# Patient Record
Sex: MALE | Race: WHITE | URBAN - METROPOLITAN AREA
[De-identification: names, ages, dates, MRNs, and addresses within clinical notes are randomized per-mention and may not be internally consistent; named-entity substitution may affect disease eponyms.]

---

## 2017-10-03 ENCOUNTER — HISTORICAL (OUTPATIENT)
Dept: LAB | Facility: HOSPITAL | Age: 62
End: 2017-10-03

## 2017-10-06 ENCOUNTER — HISTORICAL (OUTPATIENT)
Dept: CARDIOLOGY | Facility: HOSPITAL | Age: 62
End: 2017-10-06

## 2018-05-10 ENCOUNTER — HISTORICAL (OUTPATIENT)
Dept: ADMINISTRATIVE | Facility: HOSPITAL | Age: 63
End: 2018-05-10

## 2018-05-10 LAB
ABS NEUT (OLG): 7
ALBUMIN SERPL-MCNC: 4.7 GM/DL (ref 3.4–5)
ALBUMIN/GLOB SERPL: 2.24 {RATIO} (ref 1.5–2.5)
ALP SERPL-CCNC: 47 UNIT/L (ref 38–126)
ALT SERPL-CCNC: 31 UNIT/L (ref 7–52)
APPEARANCE, UA: CLEAR
AST SERPL-CCNC: 22 UNIT/L (ref 15–37)
BACTERIA #/AREA URNS AUTO: ABNORMAL /HPF
BILIRUB SERPL-MCNC: 0.8 MG/DL (ref 0.2–1)
BILIRUB UR QL STRIP: ABNORMAL MG/DL
BILIRUBIN DIRECT+TOT PNL SERPL-MCNC: 0.2 MG/DL (ref 0–0.5)
BILIRUBIN DIRECT+TOT PNL SERPL-MCNC: 0.6 MG/DL
BUN SERPL-MCNC: 13 MG/DL (ref 7–18)
CALCIUM SERPL-MCNC: 9.2 MG/DL (ref 8.5–10)
CHLORIDE SERPL-SCNC: 103 MMOL/L (ref 98–107)
CHOLEST SERPL-MCNC: 122 MG/DL (ref 0–200)
CHOLEST/HDLC SERPL: 2.5 {RATIO}
CO2 SERPL-SCNC: 27 MMOL/L (ref 21–32)
COLOR UR: ABNORMAL
CREAT SERPL-MCNC: 1 MG/DL (ref 0.6–1.3)
ERYTHROCYTE [DISTWIDTH] IN BLOOD BY AUTOMATED COUNT: 12.5 % (ref 11.5–17)
EST. AVERAGE GLUCOSE BLD GHB EST-MCNC: 126 MG/DL
GLOBULIN SER-MCNC: 2.1 GM/DL (ref 1.2–3)
GLUCOSE (UA): NEGATIVE MG/DL
GLUCOSE SERPL-MCNC: 109 MG/DL (ref 74–106)
HBA1C MFR BLD: 6 % (ref 4.4–6.4)
HCT VFR BLD AUTO: 45.1 % (ref 42–52)
HDLC SERPL-MCNC: 49 MG/DL (ref 35–60)
HGB BLD-MCNC: 14.8 GM/DL (ref 14–18)
HGB UR QL STRIP: NEGATIVE UNIT/L
KETONES UR QL STRIP: ABNORMAL MG/DL
LDLC SERPL CALC-MCNC: 50 MG/DL (ref 0–129)
LEUKOCYTE ESTERASE UR QL STRIP: NEGATIVE UNIT/L
LYMPHOCYTES # BLD AUTO: 2 X10(3)/MCL (ref 0.6–3.4)
LYMPHOCYTES NFR BLD AUTO: 20.7 % (ref 13–40)
MCH RBC QN AUTO: 30.4 PG (ref 27–31.2)
MCHC RBC AUTO-ENTMCNC: 33 GM/DL (ref 32–36)
MCV RBC AUTO: 93 FL (ref 80–94)
MONOCYTES # BLD AUTO: 0.9 X10(3)/MCL (ref 0–1.8)
MONOCYTES NFR BLD AUTO: 8.6 % (ref 0.1–24)
NEUTROPHILS NFR BLD AUTO: 70.7 % (ref 47–80)
NITRITE UR QL STRIP.AUTO: NEGATIVE
PH UR STRIP: 5.5 [PH]
PLATELET # BLD AUTO: 271 X10(3)/MCL (ref 130–400)
PMV BLD AUTO: 10.4 FL
POTASSIUM SERPL-SCNC: 4.6 MMOL/L (ref 3.5–5.1)
PROT SERPL-MCNC: 6.8 GM/DL (ref 6.4–8.2)
PROT UR QL STRIP: NEGATIVE MG/DL
PSA SERPL-MCNC: 4.69 NG/ML (ref 0–4.5)
RBC # BLD AUTO: 4.87 X10(6)/MCL (ref 4.7–6.1)
RBC #/AREA URNS HPF: ABNORMAL /HPF
SODIUM SERPL-SCNC: 137 MMOL/L (ref 136–145)
SP GR UR STRIP: >1.03
SQUAMOUS EPITHELIAL, UA: ABNORMAL /LPF
TRIGL SERPL-MCNC: 77 MG/DL (ref 30–150)
TSH SERPL-ACNC: 1.5 MIU/ML (ref 0.35–4.94)
UROBILINOGEN UR STRIP-ACNC: 0.2 MG/DL
VLDLC SERPL CALC-MCNC: 15.4 MG/DL
WBC # SPEC AUTO: 9.9 X10(3)/MCL (ref 4.5–11.5)
WBC #/AREA URNS AUTO: ABNORMAL /[HPF]

## 2018-06-26 ENCOUNTER — HISTORICAL (OUTPATIENT)
Dept: ADMINISTRATIVE | Facility: HOSPITAL | Age: 63
End: 2018-06-26

## 2018-06-26 LAB — PSA SERPL-MCNC: 2.54 NG/ML (ref 0–4.5)

## 2018-12-17 ENCOUNTER — HISTORICAL (OUTPATIENT)
Dept: ADMINISTRATIVE | Facility: HOSPITAL | Age: 63
End: 2018-12-17

## 2018-12-17 LAB
EST. AVERAGE GLUCOSE BLD GHB EST-MCNC: 123 MG/DL
HBA1C MFR BLD: 5.9 % (ref 4.4–6.4)

## 2019-06-11 ENCOUNTER — HISTORICAL (OUTPATIENT)
Dept: ADMINISTRATIVE | Facility: HOSPITAL | Age: 64
End: 2019-06-11

## 2019-06-11 LAB
ABS NEUT (OLG): 6.1 X10(3)/MCL (ref 2.1–9.2)
ALBUMIN SERPL-MCNC: 4.7 GM/DL (ref 3.4–5)
ALBUMIN/GLOB SERPL: 2.14 {RATIO} (ref 1.5–2.5)
ALP SERPL-CCNC: 43 UNIT/L (ref 38–126)
ALT SERPL-CCNC: 39 UNIT/L (ref 7–52)
APPEARANCE, UA: CLEAR
AST SERPL-CCNC: 26 UNIT/L (ref 15–37)
BACTERIA #/AREA URNS AUTO: NORMAL /HPF
BILIRUB SERPL-MCNC: 0.9 MG/DL (ref 0.2–1)
BILIRUB UR QL STRIP: NEGATIVE MG/DL
BILIRUBIN DIRECT+TOT PNL SERPL-MCNC: 0.2 MG/DL (ref 0–0.5)
BILIRUBIN DIRECT+TOT PNL SERPL-MCNC: 0.7 MG/DL
BUN SERPL-MCNC: 18 MG/DL (ref 7–18)
CALCIUM SERPL-MCNC: 9.4 MG/DL (ref 8.5–10)
CHLORIDE SERPL-SCNC: 101 MMOL/L (ref 98–107)
CHOLEST SERPL-MCNC: 137 MG/DL (ref 0–200)
CHOLEST/HDLC SERPL: 2.5 {RATIO}
CO2 SERPL-SCNC: 26 MMOL/L (ref 21–32)
COLOR UR: YELLOW
CREAT SERPL-MCNC: 1.05 MG/DL (ref 0.6–1.3)
ERYTHROCYTE [DISTWIDTH] IN BLOOD BY AUTOMATED COUNT: 12.7 % (ref 11.5–17)
EST. AVERAGE GLUCOSE BLD GHB EST-MCNC: 126 MG/DL
GLOBULIN SER-MCNC: 2.2 GM/DL (ref 1.2–3)
GLUCOSE (UA): NEGATIVE MG/DL
GLUCOSE SERPL-MCNC: 110 MG/DL (ref 74–106)
HBA1C MFR BLD: 6 % (ref 4.4–6.4)
HCT VFR BLD AUTO: 44.8 % (ref 42–52)
HDLC SERPL-MCNC: 54 MG/DL (ref 35–60)
HGB BLD-MCNC: 15 GM/DL (ref 14–18)
HGB UR QL STRIP: NEGATIVE UNIT/L
KETONES UR QL STRIP: NEGATIVE MG/DL
LDLC SERPL CALC-MCNC: 57 MG/DL (ref 0–129)
LEUKOCYTE ESTERASE UR QL STRIP: NEGATIVE UNIT/L
LYMPHOCYTES # BLD AUTO: 2.1 X10(3)/MCL (ref 0.6–3.4)
LYMPHOCYTES NFR BLD AUTO: 23.5 % (ref 13–40)
MCH RBC QN AUTO: 30.4 PG (ref 27–31.2)
MCHC RBC AUTO-ENTMCNC: 34 GM/DL (ref 32–36)
MCV RBC AUTO: 91 FL (ref 80–94)
MONOCYTES # BLD AUTO: 0.9 X10(3)/MCL (ref 0.1–1.3)
MONOCYTES NFR BLD AUTO: 9.7 % (ref 0.1–24)
NEUTROPHILS NFR BLD AUTO: 66.8 % (ref 47–80)
NITRITE UR QL STRIP.AUTO: NEGATIVE
PH UR STRIP: 7 [PH]
PLATELET # BLD AUTO: 264 X10(3)/MCL (ref 130–400)
PMV BLD AUTO: 9.5 FL (ref 9.4–12.4)
POTASSIUM SERPL-SCNC: 4.5 MMOL/L (ref 3.5–5.1)
PROT SERPL-MCNC: 6.9 GM/DL (ref 6.4–8.2)
PROT UR QL STRIP: NORMAL MG/DL
PSA SERPL-MCNC: 1.62 NG/ML (ref 0–4.5)
RBC # BLD AUTO: 4.93 X10(6)/MCL (ref 4.7–6.1)
RBC #/AREA URNS HPF: NORMAL /HPF
SODIUM SERPL-SCNC: 136 MMOL/L (ref 136–145)
SP GR UR STRIP: 1.02
SQUAMOUS EPITHELIAL, UA: NORMAL /LPF
TRIGL SERPL-MCNC: 126 MG/DL (ref 30–150)
TSH SERPL-ACNC: 1.78 MIU/ML (ref 0.35–4.94)
UROBILINOGEN UR STRIP-ACNC: 0.2 MG/DL
VLDLC SERPL CALC-MCNC: 25.2 MG/DL
WBC # SPEC AUTO: 9.1 X10(3)/MCL (ref 4.5–11.5)
WBC #/AREA URNS AUTO: NORMAL /[HPF]

## 2019-09-18 ENCOUNTER — HISTORICAL (OUTPATIENT)
Dept: ADMINISTRATIVE | Facility: HOSPITAL | Age: 64
End: 2019-09-18

## 2019-12-11 ENCOUNTER — HISTORICAL (OUTPATIENT)
Dept: ADMINISTRATIVE | Facility: HOSPITAL | Age: 64
End: 2019-12-11

## 2019-12-11 LAB
EST. AVERAGE GLUCOSE BLD GHB EST-MCNC: 123 MG/DL
HBA1C MFR BLD: 5.9 % (ref 4.4–6.4)

## 2020-07-21 ENCOUNTER — HISTORICAL (OUTPATIENT)
Dept: ADMINISTRATIVE | Facility: HOSPITAL | Age: 65
End: 2020-07-21

## 2020-07-21 LAB
ABS NEUT (OLG): 5.7 X10(3)/MCL (ref 2.1–9.2)
ALBUMIN SERPL-MCNC: 4.6 GM/DL (ref 3.4–5)
ALBUMIN/GLOB SERPL: 2.19 {RATIO} (ref 1.5–2.5)
ALP SERPL-CCNC: 57 UNIT/L (ref 38–126)
ALT SERPL-CCNC: 52 UNIT/L (ref 7–52)
APPEARANCE, UA: CLEAR
AST SERPL-CCNC: 33 UNIT/L (ref 15–37)
BACTERIA #/AREA URNS AUTO: NORMAL /HPF
BILIRUB SERPL-MCNC: 0.7 MG/DL (ref 0.2–1)
BILIRUB UR QL STRIP: NEGATIVE MG/DL
BILIRUBIN DIRECT+TOT PNL SERPL-MCNC: 0.2 MG/DL (ref 0–0.5)
BILIRUBIN DIRECT+TOT PNL SERPL-MCNC: 0.5 MG/DL
BUN SERPL-MCNC: 13 MG/DL (ref 7–18)
CALCIUM SERPL-MCNC: 9.9 MG/DL (ref 8.5–10)
CHLORIDE SERPL-SCNC: 102 MMOL/L (ref 98–107)
CHOLEST SERPL-MCNC: 141 MG/DL (ref 0–200)
CHOLEST/HDLC SERPL: 2.9 {RATIO}
CO2 SERPL-SCNC: 29 MMOL/L (ref 21–32)
COLOR UR: YELLOW
CREAT SERPL-MCNC: 1.16 MG/DL (ref 0.6–1.3)
ERYTHROCYTE [DISTWIDTH] IN BLOOD BY AUTOMATED COUNT: 12.7 % (ref 11.5–17)
EST. AVERAGE GLUCOSE BLD GHB EST-MCNC: 120 MG/DL
GLOBULIN SER-MCNC: 2.1 GM/DL (ref 1.2–3)
GLUCOSE (UA): NEGATIVE MG/DL
GLUCOSE SERPL-MCNC: 111 MG/DL (ref 74–106)
HBA1C MFR BLD: 5.8 % (ref 4.4–6.4)
HCT VFR BLD AUTO: 45.2 % (ref 42–52)
HDLC SERPL-MCNC: 48 MG/DL (ref 35–60)
HGB BLD-MCNC: 14.9 GM/DL (ref 14–18)
HGB UR QL STRIP: NEGATIVE UNIT/L
KETONES UR QL STRIP: NEGATIVE MG/DL
LDLC SERPL CALC-MCNC: 65 MG/DL (ref 0–129)
LEUKOCYTE ESTERASE UR QL STRIP: NEGATIVE UNIT/L
LYMPHOCYTES # BLD AUTO: 2 X10(3)/MCL (ref 0.6–3.4)
LYMPHOCYTES NFR BLD AUTO: 23.4 % (ref 13–40)
MCH RBC QN AUTO: 30.2 PG (ref 27–31.2)
MCHC RBC AUTO-ENTMCNC: 33 GM/DL (ref 32–36)
MCV RBC AUTO: 92 FL (ref 80–94)
MONOCYTES # BLD AUTO: 0.7 X10(3)/MCL (ref 0.1–1.3)
MONOCYTES NFR BLD AUTO: 8.6 % (ref 0.1–24)
NEUTROPHILS NFR BLD AUTO: 68 % (ref 47–80)
NITRITE UR QL STRIP.AUTO: NEGATIVE
PH UR STRIP: 6.5 [PH]
PLATELET # BLD AUTO: 252 X10(3)/MCL (ref 130–400)
PMV BLD AUTO: 10.5 FL (ref 9.4–12.4)
POTASSIUM SERPL-SCNC: 4.6 MMOL/L (ref 3.5–5.1)
PROT SERPL-MCNC: 6.7 GM/DL (ref 6.4–8.2)
PROT UR QL STRIP: NEGATIVE MG/DL
PSA SERPL-MCNC: 2.25 NG/ML (ref 0–4.5)
RBC # BLD AUTO: 4.93 X10(6)/MCL (ref 4.7–6.1)
RBC #/AREA URNS HPF: NORMAL /HPF
SODIUM SERPL-SCNC: 138 MMOL/L (ref 136–145)
SP GR UR STRIP: 1.02
SQUAMOUS EPITHELIAL, UA: NORMAL /LPF
TRIGL SERPL-MCNC: 119 MG/DL (ref 30–150)
UROBILINOGEN UR STRIP-ACNC: 0.2 MG/DL
VLDLC SERPL CALC-MCNC: 23.8 MG/DL
WBC # SPEC AUTO: 8.4 X10(3)/MCL (ref 4.5–11.5)
WBC #/AREA URNS AUTO: NORMAL /[HPF]

## 2021-03-01 ENCOUNTER — HISTORICAL (OUTPATIENT)
Dept: ADMINISTRATIVE | Facility: HOSPITAL | Age: 66
End: 2021-03-01

## 2021-03-01 LAB
EST. AVERAGE GLUCOSE BLD GHB EST-MCNC: 126 MG/DL
HBA1C MFR BLD: 6 % (ref 4.4–6.4)

## 2021-08-06 LAB
ABS NEUT (OLG): 5.3 X10(3)/MCL (ref 2.1–9.2)
ALBUMIN SERPL-MCNC: 4.8 GM/DL (ref 3.4–5)
ALBUMIN/GLOB SERPL: 2.18 {RATIO} (ref 1.5–2.5)
ALP SERPL-CCNC: 50 UNIT/L (ref 38–126)
ALT SERPL-CCNC: 34 UNIT/L (ref 7–52)
APPEARANCE, UA: CLEAR
AST SERPL-CCNC: 27 UNIT/L (ref 15–37)
BACTERIA #/AREA URNS AUTO: NORMAL /HPF
BILIRUB SERPL-MCNC: 0.9 MG/DL (ref 0.2–1)
BILIRUB UR QL STRIP: NEGATIVE MG/DL
BILIRUBIN DIRECT+TOT PNL SERPL-MCNC: 0.2 MG/DL (ref 0–0.5)
BILIRUBIN DIRECT+TOT PNL SERPL-MCNC: 0.7 MG/DL
BUN SERPL-MCNC: 15 MG/DL (ref 7–18)
CALCIUM SERPL-MCNC: 10.1 MG/DL (ref 8.5–10.1)
CHLORIDE SERPL-SCNC: 101 MMOL/L (ref 98–107)
CHOLEST SERPL-MCNC: 116 MG/DL (ref 0–200)
CHOLEST/HDLC SERPL: 2.5 {RATIO}
CO2 SERPL-SCNC: 29 MMOL/L (ref 21–32)
COLOR UR: YELLOW
CREAT SERPL-MCNC: 0.96 MG/DL (ref 0.6–1.3)
ERYTHROCYTE [DISTWIDTH] IN BLOOD BY AUTOMATED COUNT: 12.4 % (ref 11.5–17)
EST CREAT CLEARANCE SER (OHS): 121.94 ML/MIN
GLOBULIN SER-MCNC: 2.2 GM/DL (ref 1.2–3)
GLUCOSE (UA): NEGATIVE MG/DL
GLUCOSE SERPL-MCNC: 108 MG/DL (ref 74–106)
HCT VFR BLD AUTO: 44.4 % (ref 42–52)
HDLC SERPL-MCNC: 46 MG/DL (ref 35–60)
HGB BLD-MCNC: 14.8 GM/DL (ref 14–18)
HGB UR QL STRIP: NEGATIVE UNIT/L
KETONES UR QL STRIP: NEGATIVE MG/DL
LDLC SERPL CALC-MCNC: 45 MG/DL (ref 0–129)
LEUKOCYTE ESTERASE UR QL STRIP: NEGATIVE UNIT/L
LYMPHOCYTES # BLD AUTO: 2 X10(3)/MCL (ref 0.6–3.4)
LYMPHOCYTES NFR BLD AUTO: 24.7 % (ref 13–40)
MCH RBC QN AUTO: 30.3 PG (ref 27–31.2)
MCHC RBC AUTO-ENTMCNC: 33 GM/DL (ref 32–36)
MCV RBC AUTO: 91 FL (ref 80–94)
MONOCYTES # BLD AUTO: 0.8 X10(3)/MCL (ref 0.1–1.3)
MONOCYTES NFR BLD AUTO: 9.8 % (ref 0.1–24)
NEUTROPHILS NFR BLD AUTO: 65.5 % (ref 47–80)
NITRITE UR QL STRIP.AUTO: NEGATIVE
PH UR STRIP: 6 [PH]
PLATELET # BLD AUTO: 256 X10(3)/MCL (ref 130–400)
PMV BLD AUTO: 10.4 FL (ref 9.4–12.4)
POTASSIUM SERPL-SCNC: 4.5 MMOL/L (ref 3.5–5.1)
PROT SERPL-MCNC: 7 GM/DL (ref 6.4–8.2)
PROT UR QL STRIP: NEGATIVE MG/DL
PSA SERPL-MCNC: 2.4 NG/ML (ref 0–4.5)
RBC # BLD AUTO: 4.89 X10(6)/MCL (ref 4.7–6.1)
RBC #/AREA URNS HPF: NORMAL /HPF
SODIUM SERPL-SCNC: 137 MMOL/L (ref 136–145)
SP GR UR STRIP: 1.01
SQUAMOUS EPITHELIAL, UA: NORMAL /LPF
TRIGL SERPL-MCNC: 103 MG/DL (ref 30–150)
UROBILINOGEN UR STRIP-ACNC: 0.2 MG/DL
VLDLC SERPL CALC-MCNC: 20.6 MG/DL
WBC # SPEC AUTO: 8.1 X10(3)/MCL (ref 4.5–11.5)
WBC #/AREA URNS AUTO: NORMAL /[HPF]

## 2021-08-09 ENCOUNTER — HISTORICAL (OUTPATIENT)
Dept: ADMINISTRATIVE | Facility: HOSPITAL | Age: 66
End: 2021-08-09

## 2021-08-09 LAB
EST. AVERAGE GLUCOSE BLD GHB EST-MCNC: 117 MG/DL
HBA1C MFR BLD: 5.7 % (ref 4.4–6.4)

## 2022-05-02 NOTE — HISTORICAL [facility]
This is a historical note converted from Daniel. Formatting and pictures may have been removed.  Please reference Daniel for original formatting and attached multimedia. Chief Complaint  6 MONTH RECHECK NPO  History of Present Illness  Pt has been feeling well.? He is doing well on C-PAP.? His blood pressures have been running 120s/80s. Pt had a rough semester; he is off this summer except for a few concerts.?? He has an apt next week with Dr Jennings.  Sleeping much better with his C-PAP.  Appetite seems to be declining as the weather gets warmer.  Does physical activity daily; plans to increase his exercise this summer.  No tobacco.  Has a beer 1-2 x week.  Colonoscopy 8-17; polyp x 1.  Review of Systems  Constitutional:?no?weight gain,?no?weight loss,?no?fatigue, ?no?fever, ?no?chills, ?no?weakness, ?no?trouble sleeping  Eyes: ?no?vision loss/changes,?+?glasses for nearsighted,??no?pain,??no?redness,??no?blurry or double vision,??no?flashing lights,??no?glaucoma,??no?cataracts  Last eye exam:? about 3 years  Neck: ?no?lymphadenopathy,??no?thyroid abnormalities,??no?bruits,??no?stiffness  Ears:?no?decreased hearing,??no?tinnitus,??no?earache,??no?drainage?  Nose:?no?congestion,??no?rhinorrhea,??no?epistaxis,??no?sinus pressure  Throat/Oral:?no?sore throat,??no?hoarseness, ?no?dental caries,??no?gum bleeding,??no?oral lesions  Cardiovascular:?no?chest pain, palpitations, or tightness,?no?dyspnea with exertion,??no?orthopnea,??no?paroxysmal nocturnal dyspnea  Respiratory:?no?cough,?no?sputum,??no?hemoptysis,??no?dyspnea,??no?wheezing,??no?pleuritic chest pain?  Gastrointestinal:?no?abd pain,?no?nausea,?no?vomiting,??no?heartburn,??no?dysphagia or odynophagia,??no?diarrhea,??no?constipation,??no?melena,??no?hematochezia,?no?jaundice  Urinary:?no?frequency,??no?urgency,??no?burning or pain,?no?hematuria,??no?incontinence,??no?hesitancy,??no?incomplete voiding,??no?flank  pain,??no?nocturia  Musculoskeletal:?no?myalgias,?no?arthralgias,?no?neck pain,??no?back pain,??no?swelling of extremities, occasional soreness right flank a few times a week  Skin:?no?rashes,?no?sores,?no?non-healing wounds  Neurologic:?no?headaches,?no?dizziness/lightheadedness,?no?tremors,?no?paresthesias,??no?seizures,??no?muscle weakness  Psychiatric:?no?depression/sadness,?no?anhedonia,?no?irritability,?no?suicidal ideations,?no?anxiety,?no?panic attacks  Endocrine:?no?hot or cold intolerance,?no?sweating,?no?polyuria,?no?polydipsia,?no?polyphagia  Hematologic:?no?bruising,??no?bleeding disorders?  Physical Exam  Vitals & Measurements  HR:?84(Peripheral)? BP:?120/86?  HT:?180?cm? HT:?180?cm? WT:?109.8?kg? WT:?109?kg? BMI:?33.64?  ?  GENERAL: The patient is a well-developed, well-nourished male in no?apparent distress. He is alert and oriented x 4.  HEENT: Head is normocephalic and atraumatic. Extraocular muscles are intact. Pupils are equal, round, and reactive to light and accommodation. Nares appeared normal. Mouth is well hydrated and without lesions. Mucous membranes are moist. Posterior pharynx clear of any exudate or lesions.  NECK: Supple. No carotid bruits.? No lymphadenopathy or thyromegaly.  LUNGS: Clear to auscultation.  HEART: Regular rate and rhythm without murmur.  ABDOMEN: Soft, nontender, and nondistended.? Positive bowel sounds.? No hepatosplenomegaly was noted.  EXTREMITIES: Without any cyanosis, clubbing, rash, lesions or edema.  NEUROLOGIC: Cranial nerves II through XII are grossly intact.? No motor or sensory deficits.? Cerebellar function intact.  SKIN: No ulceration or induration present.  :? Nl male genitalia, no hernias,??NST,??prostate soft, smooth and without nodules.  ?  Assessment/Plan  1.?Wellness examination  ?Pt is doing well and feeling better since on C-PAP.  Ordered:  CBC w/ Auto Diff, Routine collect, 05/10/18 8:11:00 CDT, Blood, Order for future visit, Stop date 05/10/18  8:11:00 CDT, Lab Collect, Wellness examination, 05/10/18 8:11:00 CDT  Clinic Follow up, *Est. 11/10/18 3:00:00 CST, Order for future visit, Wellness examination, [facility]  Comprehensive Metabolic Panel, Routine collect, 05/10/18 8:11:00 CDT, Blood, Order for future visit, Stop date 05/10/18 8:11:00 CDT, Lab Collect, Wellness examination  Hypertension, 05/10/18 8:11:00 CDT  Lab Collection Request, 05/10/18 8:11:00 CDT, [facility] - [facility], 05/10/18 8:11:00 CDT  Lipid Panel, Routine collect, 05/10/18 8:11:00 CDT, Blood, Order for future visit, Stop date 05/10/18 8:11:00 CDT, Lab Collect, High cholesterol  Wellness examination, 05/10/18 8:11:00 CDT  Preventative Health Care Est 40-64 years [id number] PC, Wellness examination, [facility] - [facility], 05/10/18 8:11:00 CDT  Prostate Specific Antigen, Routine collect, 05/10/18 8:11:00 CDT, Blood, Order for future visit, Stop date 05/10/18 8:11:00 CDT, Lab Collect, Wellness examination, 05/10/18 8:11:00 CDT  Urinalysis Complete no reflex, Routine collect, Urine, Order for future visit, 05/10/18 8:11:00 CDT, Stop date 05/10/18 8:11:00 CDT, Nurse collect, Wellness examination  ?  2.?Hypertension  Well controlled on meds.  Ordered:  Comprehensive Metabolic Panel, Routine collect, 05/10/18 8:11:00 CDT, Blood, Order for future visit, Stop date 05/10/18 8:11:00 CDT, Lab Collect, Wellness examination  Hypertension, 05/10/18 8:11:00 CDT  ?  3.?High cholesterol  ?Check today.  Ordered:  Lipid Panel, Routine collect, 05/10/18 8:11:00 CDT, Blood, Order for future visit, Stop date 05/10/18 8:11:00 CDT, Lab Collect, High cholesterol  Wellness examination, 05/10/18 8:11:00 CDT  ?  4.?IGT - Impaired glucose tolerance  ?Check today. Continue diet and weight loss.  Ordered:  Hemoglobin A1c, Routine collect, 05/10/18 8:11:00 CDT, Blood, Order for future visit, Stop date 05/10/18 8:11:00 CDT, Lab Collect, IGT - Impaired glucose tolerance, 05/10/18 8:11:00 CDT  ?  5.?WENDY - Obstructive sleep apnea  ?Doing  well on C-PAP.  ?  6.?Obesity  Pt advised lose weight and exercise.  Ordered:  Thyroid Stimulating Hormone, Routine collect, 05/10/18 8:11:00 CDT, Blood, Order for future visit, Stop date 05/10/18 8:11:00 CDT, Lab Collect, Obesity, 05/10/18 8:11:00 CDT  ?  Orders:  atorvastatin, 20 mg = 1 tab(s), Oral, qPM, # 30 tab(s), 11 Refill(s), Pharmacy: [facility]/pharmacy #[id number]  lisinopril, 10 mg = 1 tab(s), Oral, Daily, # 30 tab(s), 11 Refill(s), Pharmacy: [facility]/pharmacy #[id number]  Pt wishes to receive Shingrix vaccine when available.   Problem List/Past Medical History  Ongoing  High cholesterol  Hypertension  IGT - Impaired glucose tolerance  WENDY - Obstructive sleep apnea  Polyp of colon  Wellness examination  Historical  HLD - Hyperlipidemia  Obesity  Procedure/Surgical History  Catheter placement in coronary artery(s) for coronary angiography, including intraprocedural injection(s) for coronary angiography, imaging supervision and interpretation; with left heart catheterization including intraprocedural injection(s) for left nick (10/06/2017), Fluoroscopy of Left Heart using Low Osmolar Contrast (10/06/2017), Fluoroscopy of Multiple Coronary Arteries using Low Osmolar Contrast (10/06/2017), Measurement of Cardiac Sampling and Pressure, Left Heart, Percutaneous Approach (10/06/2017), Colonoscopy (08/02/2017), Colonoscopy (01/23/2013), Knee Surgery, Right (1999).  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 Home equipment: CPAP/BiPAP., 05/10/2018  Nutrition/Health  Regular, Caffeine intake amount: 2 CUPS OF COFFEE PER DAY., 05/10/2018  Substance Abuse  Never, 05/10/2018  Tobacco  Never smoker, 10/06/2017  Family History  CAD - Coronary artery disease: Mother.  Diabetes mellitus: Mother.  Food allergy: Sister.  Hypertension: Mother.  Obesity: Sister, Brother and Brother.  Immunizations  Vaccine Date Status   pneumococcal 13-valent conjugate vaccine 03/17/2015 Recorded

## 2022-05-02 NOTE — HISTORICAL [facility]
This is a historical note converted from Daniel. Formatting and pictures may have been removed.  Please reference Daniel for original formatting and attached multimedia. Chief Complaint  6 MONTH RECHECK- NPO  History of Present Illness  Pt presents for 6 month recheck. He does various exercise on a daily basis. He has cut back on his carbohydrates. He has been feeling well. He has a good diet. He has been doing well on his C-PAP.  His semester is over. He is preparing for January. He is urinating much better with the Flomax.  Review of Systems  See HPI.  He has an appointment?with Dr Jennings this morning.  Physical Exam  Vitals & Measurements  HR:?72(Peripheral)? BP:?108/76?  HT:?180?cm? HT:?180?cm? WT:?111.6?kg? WT:?111.6?kg? BMI:?34.44?  See Vitals.  Gen:? wd, wn wm in no apparent distress  CV:?reg s mrg  Chest: cta bilaterally  Assessment/Plan  1.?Hypertension?I10  ? Well controlled.  Ordered:  Clinic Follow Up Physical, *Est. 06/17/19 3:00:00 CDT, Order for future visit, Hypertension  High cholesterol  IGT - Impaired glucose tolerance  WENDY - Obstructive sleep apnea  Obesity, [facility]  Office/Outpatient Visit Level 3 Established [id number] PC, Hypertension  High cholesterol  IGT - Impaired glucose tolerance  WENDY - Obstructive sleep apnea  Obesity, [facility] - [facility], 12/17/18 9:54:00 CST  ?  2.?High cholesterol?E78.00  ? Well controlled.  Ordered:  Clinic Follow Up Physical, *Est. 06/17/19 3:00:00 CDT, Order for future visit, Hypertension  High cholesterol  IGT - Impaired glucose tolerance  WENDY - Obstructive sleep apnea  Obesity, [facility]  Office/Outpatient Visit Level 3 Established [id number] PC, Hypertension  High cholesterol  IGT - Impaired glucose tolerance  WENDY - Obstructive sleep apnea  Obesity, [facility] - [facility], 12/17/18 9:54:00 CST  ?  3.?IGT - Impaired glucose tolerance?R73.02  ? Check A1C today.  Ordered:  Clinic Follow Up Physical, *Est. 06/17/19 3:00:00 CDT, Order for future visit, Hypertension   High cholesterol  IGT - Impaired glucose tolerance  WENDY - Obstructive sleep apnea  Obesity, [facility]  Hemoglobin A1c, Routine collect, 12/17/18 9:54:00 CST, Blood, Order for future visit, Stop date 12/17/18 9:54:00 CST, Lab Collect, IGT - Impaired glucose tolerance, 12/17/18 9:54:00 CST  Office/Outpatient Visit Level 3 Established [id number] PC, Hypertension  High cholesterol  IGT - Impaired glucose tolerance  WENDY - Obstructive sleep apnea  Obesity, [facility] - [facility], 12/17/18 9:54:00 CST  ?  4.?WENDY - Obstructive sleep apnea?G47.33  ? Doing well on C-PAP.  Ordered:  Clinic Follow Up Physical, *Est. 06/17/19 3:00:00 CDT, Order for future visit, Hypertension  High cholesterol  IGT - Impaired glucose tolerance  WENDY - Obstructive sleep apnea  Obesity, [facility]  Office/Outpatient Visit Level 3 Established [id number] PC, Hypertension  High cholesterol  IGT - Impaired glucose tolerance  WENDY - Obstructive sleep apnea  Obesity, [facility] - [facility], 12/17/18 9:54:00 CST  ?  5.?Obesity?E66.9  ? Pt encourage to continue exercise and?diet.  Ordered:  Clinic Follow Up Physical, *Est. 06/17/19 3:00:00 CDT, Order for future visit, Hypertension  High cholesterol  IGT - Impaired glucose tolerance  WENDY - Obstructive sleep apnea  Obesity, [facility]  Office/Outpatient Visit Level 3 Established [id number] PC, Hypertension  High cholesterol  IGT - Impaired glucose tolerance  WENDY - Obstructive sleep apnea  Obesity, [facility] - [facility], 12/17/18 9:54:00 CST  ?   Problem List/Past Medical History  Ongoing  Elevated PSA  High cholesterol  Hypertension  IGT - Impaired glucose tolerance  Obesity  WENDY - Obstructive sleep apnea  Polyp of colon  Seborrheic keratosis  Historical  HLD - Hyperlipidemia  Procedure/Surgical History  Colonoscopy (08/02/2017)  Colonoscopy (01/23/2013)  Knee Surgery, Right (1999)   Medications  aspirin 81 mg oral tablet, 81 mg= 1 tab(s), Oral, qPM  atorvastatin 20 mg oral tablet, 20 mg= 1 tab(s), Oral, qPM, 11  refills  Fish Oil oral capsule, 1 cap(s), Oral, Daily  lisinopril 10 mg oral tablet, 10 mg= 1 tab(s), Oral, Daily, 11 refills  multivitamin with minerals (Adult Tab), 1 tab(s), Oral, Daily  tamsulosin 0.4 mg oral capsule, See Instructions, 5 refills  Allergies  penicillin?(Nightmares (as a toddler))  Social History  Alcohol  Current, Beer, Wine, 1-2 times per week, 05/10/2018  Employment/School  Employed, Work/School description: PROFESSOR., 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 Home equipment: CPAP/BiPAP., 05/10/2018  Nutrition/Health  Regular, Caffeine intake amount: 2 CUPS OF COFFEE PER DAY., 05/10/2018  Substance Abuse  Never, 05/10/2018  Tobacco  Never smoker, N/A, 12/17/2018  Never smoker, 10/06/2017  Family History  CAD - Coronary artery disease: Mother.  Diabetes mellitus: Mother.  Food allergy: Sister.  Hypertension: Mother.  Obesity: Sister, Brother and Brother.  Immunizations  Vaccine Date Status   influenza virus vaccine, inactivated 10/17/2018 Recorded   influenza virus vaccine, inactivated 09/19/2017 Recorded   pneumococcal 13-valent conjugate vaccine 03/17/2015 Recorded   influenza virus vaccine, inactivated 12/31/2014 Recorded   Health Maintenance  Health Maintenance  ???Pending?(in the next year)  ??? ??OverDue  ??? ? ? ?Coronary Artery Disease Maintenance-Antiplatelet Agent Prescribed due??and every?  ??? ? ? ?Coronary Artery Disease Maintenance-Lipid Lowering Therapy due??and every?  ??? ? ? ?Diabetes Screening due??and every?  ??? ? ? ?Influenza Vaccine due??and every?  ??? ? ? ?Aspirin Therapy for CVD Prevention due??10/03/18??and every 1??year(s)  ??? ??Due?  ??? ? ? ?ADL Screening due??12/17/18??and every 1??year(s)  ??? ? ? ?Alcohol Misuse Screening due??12/17/18??and every 1??year(s)  ??? ? ? ?Depression Screening due??12/17/18??and every?  ??? ? ? ?Hypertension Management-Education due??12/17/18??and every  1??year(s)  ??? ? ? ?Smoking Cessation due??12/17/18??Variable frequency  ??? ? ? ?Tetanus Vaccine due??12/17/18??and every 10??year(s)  ??? ? ? ?Zoster Vaccine due??12/17/18??and every 100??year(s)  ??? ??Due In Future?  ??? ? ? ?Hypertension Management-BMP not due until??05/10/19??and every 1??year(s)  ???Satisfied?(in the past 1 year)  ??? ??Satisfied?  ??? ? ? ?Blood Pressure Screening on??12/17/18.??Satisfied by Louise Ulloa LPN  ??? ? ? ?Body Mass Index Check on??12/17/18.??Satisfied by Louise Ulloa LPN  ??? ? ? ?Coronary Artery Disease Maintenance-Lipid Lowering Therapy on??05/10/18.??Satisfied by Claudio Maria MD  ??? ? ? ?Diabetes Screening on??05/10/18.??Satisfied by Nereyda Coker  ??? ? ? ?Hypertension Management-Blood Pressure on??12/17/18.??Satisfied by Louise Ulloa LPN  ??? ? ? ?Influenza Vaccine on??10/17/18.??Satisfied by Bubba Vasquez LPN  ??? ? ? ?Lipid Screening on??05/10/18.??Satisfied by Justin Christian  ??? ? ? ?Obesity Screening on??12/17/18.??Satisfied by Louise Ulloa LPN  ?  ?

## 2022-05-02 NOTE — HISTORICAL [facility]
This is a historical note converted from Daniel. Formatting and pictures may have been removed.  Please reference Daniel for original formatting and attached multimedia. Chief Complaint  6 WEEK RECHECK- PROSTATITIS  History of Present Illness  Pt is? here to recheck his PSA.? He immediately noticed some improvement with urination when he started the antibiotic.? He has been hydrating more. He urinates every 2 hours. He urinates 2 x nite. He denies dysuria. He reports decreased stream. He doesnt empty his bladder. He has intermittency. He denies hesitancy or dribbling. No fever/chills. He denies any history of utis?or prostatitis. No testicular pain.? He denies any family history of prostate issues.  Review of Systems  See HPI.  Physical Exam  Vitals & Measurements  HR:?72(Peripheral)? BP:?130/80?  HT:?180?cm? HT:?180?cm? WT:?111.4?kg? WT:?111.4?kg? BMI:?34.38?  : deferred  Assessment/Plan  1.?Elevated PSA  ?Recheck today. Pt has symptoms consistent with prostatism. Will give trial of Flomax 0.4 mg and have patient call in 1 month with update on how he is urinating.  Ordered:  Lab Collection Request, 06/26/18 8:50:00 CDT, [facility] - [facility], 06/26/18 8:50:00 CDT  Office/Outpatient Visit Level 2 Established [id number] PC, Elevated PSA, [facility] - [facility], 06/26/18 8:50:00 CDT  Prostate Specific Antigen, Routine collect, 06/26/18 8:50:00 CDT, Blood, Order for future visit, Stop date 06/26/18 8:50:00 CDT, Lab Collect, Elevated PSA, 06/26/18 8:50:00 CDT  ?  Orders:  Clinic Follow-up PRN, 06/26/18 8:50:00 CDT, [facility] - [facility], Future Order   Problem List/Past Medical History  Ongoing  Elevated PSA  High cholesterol  Hypertension  IGT - Impaired glucose tolerance  WENDY - Obstructive sleep apnea  Polyp of colon  Wellness examination  Historical  HLD - Hyperlipidemia  Obesity  Procedure/Surgical History  Catheter placement in coronary artery(s) for coronary angiography, including intraprocedural injection(s) for coronary angiography,  imaging supervision and interpretation; with left heart catheterization including intraprocedural injection(s) for left nick (10/06/2017), Fluoroscopy of Left Heart using Low Osmolar Contrast (10/06/2017), Fluoroscopy of Multiple Coronary Arteries using Low Osmolar Contrast (10/06/2017), Measurement of Cardiac Sampling and Pressure, Left Heart, Percutaneous Approach (10/06/2017), Colonoscopy (08/02/2017), Colonoscopy (01/23/2013), Knee Surgery, Right (1999).  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 Home equipment: CPAP/BiPAP., 05/10/2018  Nutrition/Health  Regular, Caffeine intake amount: 2 CUPS OF COFFEE PER DAY., 05/10/2018  Substance Abuse  Never, 05/10/2018  Tobacco  Never smoker, 10/06/2017  Family History  CAD - Coronary artery disease: Mother.  Diabetes mellitus: Mother.  Food allergy: Sister.  Hypertension: Mother.  Obesity: Sister, Brother and Brother.  Immunizations  Vaccine Date Status   pneumococcal 13-valent conjugate vaccine 03/17/2015 Recorded   Health Maintenance  Health Maintenance  ???Pending?(in the next year)  ??? ??Due?  ??? ? ? ?Alcohol Misuse Screening due??06/26/18??and every 1??year(s)  ??? ? ? ?Depression Screening due??06/26/18??and every 1??year(s)  ??? ? ? ?Hypertension Management-Education due??06/26/18??and every 1??year(s)  ??? ? ?  ?Smoking Cessation due??06/26/18??and every 1??year(s)  ??? ? ? ?Tetanus Vaccine due??06/26/18??and every 10??year(s)  ??? ? ? ?Zoster Vaccine due??06/26/18??and every 100??year(s)  ??? ??Due In Future?  ??? ? ? ?Influenza Vaccine not due until??10/02/18??and every 1??year(s)  ??? ? ? ?Aspirin Therapy for CVD Prevention not due until??10/03/18??and every 1??year(s)  ??? ? ? ?Hypertension Management-Blood Pressure not due until??12/26/18??and every 6??month(s)  ??? ? ? ?Hypertension Management-BMP not due until??05/10/19??and every 1??year(s)  ??? ? ? ?Lipid Screening not due until??05/10/19??and every 1??year(s)  ???Satisfied?(in the past 1 year)  ??? ??Satisfied?  ??? ? ? ?Aspirin Therapy for CVD Prevention on??10/03/17.  ??? ? ? ?Blood Pressure Screening on??06/26/18.??Satisfied by Louise Ulloa  ??? ? ? ?Body Mass Index Check on??06/26/18.??Satisfied by Louise Ulloa  ??? ? ? ?Colorectal Screening on??08/02/17.??Satisfied by Louise Ulloa  ??? ? ? ?Coronary Artery Disease Maintenance-Lipid Lowering Therapy on??05/10/18.??Satisfied by Claudio Maria MD  ??? ? ? ?Diabetes Screening on??05/10/18.??Satisfied by Nereyda Coker  ??? ? ? ?Hypertension Management-Blood Pressure on??06/26/18.??Satisfied by Louise Ulloa  ??? ? ? ?Lipid Screening on??05/10/18.??Satisfied by Justin Christian  ??? ? ? ?Obesity Screening on??06/26/18.??Satisfied by Louise Ulloa  ??? ? ? ?Tobacco Use Screening on??06/26/18.??Satisfied by Louise Ulloa  ?  ?

## 2022-05-02 NOTE — HISTORICAL [facility]
This is a historical note converted from Daniel. Formatting and pictures may have been removed.  Please reference Daniel for original formatting and attached multimedia. Chief Complaint  ANNUAL WELLNESS PHYSICAL- NPO  History of Present Illness  Patient presents for wellness exam.  He has been feeling well; he is feeling better.  He is sleeping better with his C-PAP; he is compliant with treatment.  He has cleaned up his diet and is eating less.  He is a professor.  He has?beer or wine once a week.  He exercises daily: bikes, walks, weights.  He drinks 2 cups of coffee a day.?  He does not smoke.  Colonoscopy 2017: Dr Thomas.  Cardiologist: Dr Jennings; he has an appointment next week  Review of Systems  Constitutional:?no?weight gain,?no?weight loss,?+?fatigue: slowly improving over time, ?no?fever, ?no?chills, ?no?weakness, ?no?trouble sleeping  Eyes: ?no?vision loss/changes,?+?glasses,??no?pain,??no?redness,??no?blurry or double vision,??no?flashing lights,??no?glaucoma,??no?cataracts  Last eye exam:?about 1 year ago  Neck: ?no?lymphadenopathy,??no?thyroid abnormalities,??no?bruits,??no?stiffness  Ears:?no?decreased hearing,??no?tinnitus,??no?earache,??no?drainage?  Nose:?no?congestion,??no?rhinorrhea,??no?epistaxis,??no?sinus pressure  Throat/Oral:?no?sore throat,??no?hoarseness, ?no?dental caries,??no?gum bleeding,??no?oral lesions  Cardiovascular:?no?chest pain, palpitations, or tightness,?no?dyspnea with exertion,??no?orthopnea,??no?paroxysmal nocturnal dyspnea  Respiratory:?no?cough,?no?sputum,??no?hemoptysis,??no?dyspnea,??no?wheezing,??no?pleuritic chest pain?  Gastrointestinal:?no?abdominal pain,?no?nausea,?no?vomiting,??no?heartburn,??no?dysphagia or odynophagia,??no?diarrhea,??no?constipation,??no?melena,??no?hematochezia,?no?jaundice  Urinary:?no?frequency,??no?urgency,??no?burning or pain,?no?hematuria,??no?incontinence,??no?hesitancy,??no?incomplete voiding,??no?flank  pain,??no?nocturia  Musculoskeletal:?no?myalgias,?no?arthralgias,?no?neck pain,??+?back pain: right lower, hurts regularly,??no?swelling of extremities  Skin:?no?rashes,?no?sores,?no?non-healing wounds  Neurologic:?no?headaches,?no?dizziness/lightheadedness,?no?tremors,?no?paresthesias,??no?seizures,??no?muscle weakness  Psychiatric:?no?depression/sadness,?no?anhedonia,?no?irritability,?no?suicidal ideations,?no?anxiety,?no?panic attacks  Endocrine:?no?hot or cold intolerance,?no?sweating,?no?polyuria,?no?polydipsia,?no?polyphagia  Hematologic:?no?bruising,??no?bleeding disorders?  Physical Exam  Vitals & Measurements  HR:?72(Peripheral)? BP:?90/60?  HT:?180?cm? WT:?110.9?kg? BMI:?34.23?  ?  GENERAL: The patient is a well-developed, well-nourished male in no?apparent distress. He is alert and oriented x 4.  HEENT: Head is normocephalic and atraumatic. Extraocular muscles are intact. Pupils are equal, round, and reactive to light and accommodation. Nares appeared normal. Mouth is well hydrated and without lesions. Mucous membranes are moist. Posterior pharynx clear of any exudate or lesions.  NECK: Supple. No carotid bruits.? No lymphadenopathy or thyromegaly. ?He has a fungal rash?around his neck.  LUNGS: Clear to auscultation.  HEART: Regular rate and rhythm without murmurs, rubs or gallops.  ABDOMEN: Soft, nontender, and nondistended.? Positive bowel sounds.? No hepatosplenomegaly was noted.  EXTREMITIES: Without any cyanosis, clubbing, rash, lesions or edema. ?Left plantar foot:?Small plantar wart?in area of fifth?metatarsal head.  NEUROLOGIC: Cranial nerves II through XII are grossly intact.? No motor or sensory deficits.? Cerebellar function intact.  SKIN: No ulceration or induration present. ?Seborrheic keratosis?over right lateral?posterior rib cage.  :? Normal male genitalia, no hernias,?testes descended with normal size and consistency.??NST,??prostate soft, smooth and without nodules, mildly  enlarged.  ?  Assessment/Plan  1.?Wellness examination?Z00.00  Patient is doing well.? He is physically active?and eating well.  Tdap?0.5 IM administered.  Ordered:  Preventative Health Care Est 40-64 years [id number] PC, Wellness examination  Hypertension  High cholesterol  IGT - Impaired glucose tolerance  WENDY - Obstructive sleep apnea, [facility], 06/11/19 8:35:00 CDT  Prostate Specific Antigen, Routine collect, 06/11/19 8:55:00 CDT, Blood, Stop date 06/11/19 8:55:00 CDT, Lab Collect, Wellness examination, 06/11/19 8:55:00 CDT  ?  2.?Hypertension?I10  ?Well-controlled.  Ordered:  Clinic Follow up, *Est. 12/11/19 8:30:00 CST, Order for future visit, WENDY - Obstructive sleep apnea, [facility]  Preventative Health Care Est 40-64 years [id number] PC, Wellness examination  Hypertension  High cholesterol  IGT - Impaired glucose tolerance  WENDY - Obstructive sleep apnea, [facility] - [facility], 06/11/19 8:35:00 CDT  Thyroid Stimulating Hormone, Routine collect, 06/11/19 8:55:00 CDT, Blood, Stop date 06/11/19 8:55:00 CDT, Lab Collect, Hypertension, 06/11/19 8:55:00 CDT  ?  3.?High cholesterol?E78.00  ?We will check today.  Ordered:  Clinic Follow up, *Est. 12/11/19 8:30:00 CST, Order for future visit, WENDY - Obstructive sleep apnea, [facility]  Preventative Health Care Est 40-64 years [id number] PC, Wellness examination  Hypertension  High cholesterol  IGT - Impaired glucose tolerance  WENDY - Obstructive sleep apnea, [facility], 06/11/19 8:35:00 CDT  ?  4.?IGT - Impaired glucose tolerance?R73.02  ?We will check A1c today.  Ordered:  Clinic Follow up, *Est. 12/11/19 8:30:00 CST, Order for future visit, WENDY - Obstructive sleep apnea, [facility]  Preventative Health Care Est 40-64 years [id number] PC, Wellness examination  Hypertension  High cholesterol  IGT - Impaired glucose tolerance  WENDY - Obstructive sleep apnea, [facility], 06/11/19 8:35:00 CDT  ?  5.?WENDY - Obstructive sleep apnea?G47.33  ?He is compliant with and doing  well on CPAP.  Ordered:  Clinic Follow up, *Est. 12/11/19 8:30:00 CST, Order for future visit, WENDY - Obstructive sleep apnea, [facility]  Preventative Health Care Est 40-64 years [id number] PC, Wellness examination  Hypertension  High cholesterol  IGT - Impaired glucose tolerance  WENDY - Obstructive sleep apnea, [facility], 06/11/19 8:35:00 CDT  ?  6.?Plantar wart?B07.0  ?Lesion treated with cryotherapy.? Post-cryo?skin care discussed with patient.  Ordered:  Lesion 1 Benign/Prelim - Destruction [id number] PC, 06/11/19 9:01:00 CDT, [facility], 06/11/19 9:01:00 CDT  ?  7.?Tinea versicolor?B36.0  ?Ketoconazole?200 mg: Take 2 tablets as directed. ?Patient instructed on how to take for?maximum effectiveness.  ?  Orders:  ketoconazole, See Instructions, Take 2 tablets as one dose as directed., # 2 tab(s), 0 Refill(s), Pharmacy: [facility] #[id number]  tamsulosin, See Instructions, TAKE ONE CAPSULE BY MOUTH DAILY., # 30 cap(s), 5 Refill(s), Pharmacy: [facility]/pharmacy #[id number]  Referrals  Clinic Follow up, *Est. 12/11/19 8:30:00 CST, Order for future visit, WENDY - Obstructive sleep apnea, [facility]   Problem List/Past Medical History  Ongoing  Elevated PSA  High cholesterol  Hypertension  IGT - Impaired glucose tolerance  Obesity  WENDY - Obstructive sleep apnea  Plantar wart  Tinea versicolor  Historical  HLD - Hyperlipidemia  Polyp of colon  Seborrheic keratosis  Procedure/Surgical History  Catheter placement in coronary artery(s) for coronary angiography, including intraprocedural injection(s) for coronary angiography, imaging supervision and interpretation; with left heart catheterization including intraprocedural injection(s) for left nick (10/06/2017)  Fluoroscopy of Left Heart using Low Osmolar Contrast (10/06/2017)  Fluoroscopy of Multiple Coronary Arteries using Low Osmolar Contrast (10/06/2017)  Measurement of Cardiac Sampling and Pressure, Left Heart, Percutaneous Approach (10/06/2017)  Colonoscopy (08/02/2017)  Colonoscopy  (01/23/2013)  Knee Surgery, Right (1999)   Medications  aspirin 81 mg oral tablet, 81 mg= 1 tab(s), Oral, qPM  atorvastatin 20 mg oral tablet, 20 mg= 1 tab(s), Oral, qPM, 11 refills  Fish Oil oral capsule, 1 cap(s), Oral, Daily  ketoconazole 200 mg oral tablet, See Instructions  lisinopril 10 mg oral tablet, See Instructions, 2 refills  multivitamin with minerals (Adult Tab), 1 tab(s), Oral, Daily  tamsulosin 0.4 mg oral capsule, See Instructions, 5 refills  Allergies  penicillin?(Nightmares (as a toddler))  Social History  Abuse/Neglect  No, 06/11/2019  Alcohol  Current, Beer, Wine, 1-2 times per week, 05/10/2018  Employment/School  Employed, Work/School description: PROFESSOR., 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 Home equipment: CPAP/BiPAP., 05/10/2018  Nutrition/Health  Regular, Caffeine intake amount: 2 CUPS OF COFFEE PER DAY., 05/10/2018  Substance Use  Never, 05/10/2018  Tobacco  Never (less than 100 in lifetime), No, 06/11/2019  Never smoker, N/A, 12/17/2018  Never smoker, 10/06/2017  Family History  CAD - Coronary artery disease: Mother.  Diabetes mellitus: Mother.  Food allergy: Sister.  Hypertension: Mother.  Obesity: Sister, Brother and Brother.  Immunizations  Vaccine Date Status   tetanus/diphtheria/pertussis, acel(Tdap) 06/11/2019 Given   influenza virus vaccine, inactivated 10/17/2018 Recorded   influenza virus vaccine, inactivated 09/19/2017 Recorded   pneumococcal 13-valent conjugate vaccine 03/17/2015 Recorded   influenza virus vaccine, inactivated 12/31/2014 Recorded   Health Maintenance  Health Maintenance  ???Pending?(in the next year)  ??? ??OverDue  ??? ? ? ?Coronary Artery Disease Maintenance-Antiplatelet Agent Prescribed due??and every?  ??? ? ? ?Coronary Artery Disease Maintenance-Lipid Lowering Therapy due??and every?  ??? ? ? ?Diabetes Screening due??and every?  ??? ? ? ?Aspirin Therapy for CVD Prevention  due??10/03/18??and every 1??year(s)  ??? ? ? ?Alcohol Misuse Screening due??01/01/19??and every 1??year(s)  ??? ??Due?  ??? ? ? ?ADL Screening due??06/11/19??and every 1??year(s)  ??? ? ? ?Depression Screening due??06/11/19??and every?  ??? ? ? ?Hypertension Management-Education due??06/11/19??and every 1??year(s)  ??? ? ? ?Zoster Vaccine due??06/11/19??and every 100??year(s)  ??? ??Due In Future?  ??? ? ? ?Obesity Screening not due until??01/01/20??and every 1??year(s)  ??? ? ? ?Blood Pressure Screening not due until??06/10/20??and every 1??year(s)  ??? ? ? ?Body Mass Index Check not due until??06/10/20??and every 1??year(s)  ??? ? ? ?Hypertension Management-Blood Pressure not due until??06/10/20??and every 1??year(s)  ??? ? ? ?Hypertension Management-BMP not due until??06/10/20??and every 1??year(s)  ???Satisfied?(in the past 1 year)  ??? ??Satisfied?  ??? ? ? ?Blood Pressure Screening on??06/11/19.??Satisfied by Louise Ulloa LPN  ??? ? ? ?Body Mass Index Check on??06/11/19.??Satisfied by Louise Ulloa LPN  ??? ? ? ?Diabetes Screening on??06/11/19.??Satisfied by Ariadne Everett  ??? ? ? ?Hypertension Management-BMP on??06/11/19.??Satisfied by Ariadne Everett  ??? ? ? ?Influenza Vaccine on??10/17/18.??Satisfied by Bubba Vasquez LPN  ??? ? ? ?Lipid Screening on??06/11/19.??Satisfied by Ariadne Everett  ??? ? ? ?Obesity Screening on??06/11/19.??Satisfied by Louise Ulloa LPN  ??? ? ? ?Tetanus Vaccine on??06/11/19.??Satisfied by Louise Ulloa LPN  ?  ?

## 2022-05-03 NOTE — HISTORICAL [facility]
This is a historical note converted from Daniel. Formatting and pictures may have been removed.  Please reference Daniel for original formatting and attached multimedia. Chief Complaint  LEFT FOOT PAIN  History of Present Illness  He has had left foot pain x 1 month. One day, he walked all day in his loafers. His foot has been bothering him since. He has not been taking anything. Denies any injuries or trauma to foot. He normally wears a good dress shoe. He does recreational walking. He walks a lot. His pain in the heel area.  Review of Systems  See HPI.  Physical Exam  Vitals & Measurements  HR:?84(Peripheral)? BP:?118/80?  HT:?180?cm? WT:?113.4?kg? BMI:?35?  General: He is well-developed well-nourished pleasant white male in no apparent distress.  Left foot: Normal in appearance,?no skin lesions,?mildly tender to palpation over medial heel.  Assessment/Plan  1.?Plantar fasciitis, left?M72.2  Patient info?given.  Ice bottle for massage/Achilles stretches.  Aleve/ibuprofen.  Patient advised to wear proper footwear;?pt advised to go to [facility].  Ordered:  Office/Outpatient Visit Level 3 Established [id number] PC, Plantar fasciitis, left, [facility], 09/18/19 16:52:00 CDT  ?  Orders:  Clinic Follow-up PRN, 09/18/19 16:52:00 CDT, [facility], Future Order  XR Foot Left Minimum 3 Views, Routine, 09/18/19 16:02:00 CDT, Other (please specify), None, Patient Bed, Patient Has IV?, Rad Type, Left foot pain, [facility], 09/18/19 16:02:00 CDT  Referrals  Clinic Follow-up PRN, 09/18/19 16:52:00 CDT, [facility] - [facility], Future Order   Problem List/Past Medical History  Ongoing  Elevated PSA  High cholesterol  Hypertension  IGT - Impaired glucose tolerance  Obesity  WENDY - Obstructive sleep apnea  Plantar fasciitis, left  Plantar wart  Tinea versicolor  Historical  HLD - Hyperlipidemia  Polyp of colon  Seborrheic keratosis  Procedure/Surgical History  Catheter placement in coronary artery(s) for coronary  angiography, including intraprocedural injection(s) for coronary angiography, imaging supervision and interpretation; with left heart catheterization including intraprocedural injection(s) for left nick (10/06/2017)  Fluoroscopy of Left Heart using Low Osmolar Contrast (10/06/2017)  Fluoroscopy of Multiple Coronary Arteries using Low Osmolar Contrast (10/06/2017)  Measurement of Cardiac Sampling and Pressure, Left Heart, Percutaneous Approach (10/06/2017)  Colonoscopy (08/02/2017)  Colonoscopy (01/23/2013)  Knee Surgery, Right (1999)   Medications  aspirin 81 mg oral tablet, 81 mg= 1 tab(s), Oral, qPM  atorvastatin 20 mg oral tablet, See Instructions, 11 refills  atorvastatin 20 mg oral tablet, 20 mg= 1 tab(s), Oral, qPM, 11 refills  atorvastatin 20 mg oral tablet, See Instructions, 5 refills  Fish Oil oral capsule, 1 cap(s), Oral, Daily  lisinopril 10 mg oral tablet, See Instructions, 2 refills  lisinopril 10 mg oral tablet, See Instructions, 5 refills  multivitamin with minerals (Adult Tab), 1 tab(s), Oral, Daily  tamsulosin 0.4 mg oral capsule, See Instructions, 5 refills  Allergies  penicillin?(Nightmares (as a toddler))  Social History  Abuse/Neglect  No, 09/18/2019  No, 06/11/2019  Alcohol  Current, Beer, Wine, 1-2 times per week, 05/10/2018  Employment/School  Employed, Work/School description: PROFESSOR., 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 Home equipment: CPAP/BiPAP., 05/10/2018  Nutrition/Health  Regular, Caffeine intake amount: 2 CUPS OF COFFEE PER DAY., 05/10/2018  Substance Use  Never, 05/10/2018  Tobacco  Never (less than 100 in lifetime), N/A, 09/18/2019  Never (less than 100 in lifetime), No, 06/11/2019  Never smoker, N/A, 12/17/2018  Never smoker, 10/06/2017  Family History  CAD - Coronary artery disease: Mother.  Diabetes mellitus: Mother.  Food allergy: Sister.  Hypertension: Mother.  Obesity: Sister, Brother and  Brother.  Immunizations  Vaccine Date Status   tetanus/diphtheria/pertussis, acel(Tdap) 06/11/2019 Given   influenza virus vaccine, inactivated 10/17/2018 Recorded   influenza virus vaccine, inactivated 09/19/2017 Recorded   pneumococcal 13-valent conjugate vaccine 03/17/2015 Recorded   influenza virus vaccine, inactivated 12/31/2014 Recorded   Health Maintenance  Health Maintenance  ???Pending?(in the next year)  ??? ??OverDue  ??? ? ? ?Coronary Artery Disease Maintenance-Antiplatelet Agent Prescribed due??and every?  ??? ? ? ?Coronary Artery Disease Maintenance-Lipid Lowering Therapy due??and every?  ??? ? ? ?Diabetes Screening due??and every?  ??? ? ? ?Aspirin Therapy for CVD Prevention due??10/03/18??and every 1??year(s)  ??? ? ? ?Alcohol Misuse Screening due??01/01/19??and every 1??year(s)  ??? ??Due?  ??? ? ? ?ADL Screening due??09/18/19??and every 1??year(s)  ??? ? ? ?Depression Screening due??09/18/19??and every?  ??? ? ? ?Hypertension Management-Education due??09/18/19??and every 1??year(s)  ??? ? ? ?Influenza Vaccine due??09/18/19??and every?  ??? ? ? ?Zoster Vaccine due??09/18/19??and every 100??year(s)  ??? ??Due In Future?  ??? ? ? ?Obesity Screening not due until??01/01/20??and every 1??year(s)  ??? ? ? ?Hypertension Management-BMP not due until??06/10/20??and every 1??year(s)  ??? ? ? ?Blood Pressure Screening not due until??09/17/20??and every 1??year(s)  ??? ? ? ?Body Mass Index Check not due until??09/17/20??and every 1??year(s)  ??? ? ? ?Hypertension Management-Blood Pressure not due until??09/17/20??and every 1??year(s)  ???Satisfied?(in the past 1 year)  ??? ??Satisfied?  ??? ? ? ?Blood Pressure Screening on??09/18/19.??Satisfied by Louise Ulloa LPN  ??? ? ? ?Body Mass Index Check on??09/18/19.??Satisfied by Louise Ulloa LPN  ??? ? ? ?Coronary Artery Disease Maintenance-Lipid Lowering Therapy on??07/22/19.??Satisfied by Claudio Maria MD  ??? ? ? ?Diabetes Screening on??06/11/19.??Satisfied  by Ariadne Everett  ??? ? ? ?Hypertension Management-Blood Pressure on??09/18/19.??Satisfied by Louise Ulloa LPN  ??? ? ? ?Influenza Vaccine on??09/18/19.??Satisfied by Louise Ulloa LPN  ??? ? ? ?Lipid Screening on??06/11/19.??Satisfied by Ariadne Everett  ??? ? ? ?Obesity Screening on??09/18/19.??Satisfied by Louise Ulloa LPN  ??? ? ? ?Tetanus Vaccine on??06/11/19.??Satisfied by Louise Ulloa LPN  ?      X-rays of left foot are normal.

## 2022-05-03 NOTE — HISTORICAL [facility] CERNER
This is a historical note converted from Daniel. Formatting and pictures may have been removed.  Please reference Daniel for original formatting and attached multimedia. Chief Complaint  6 MONTH RECHECK; PLANTAR FASCIITIS  History of Present Illness  Patient presents for a?6-month recheck. He has been doing well except for his left plantar fasciitis. He states his foot is doing better. He uses an ice massage and he found a boot that stretches his foot. He still has daily discomfort; it limits his activities.  es not eat starches. He does not eat carbs after 4. He has changed to brown rice. He eats sweet potatoes.  He sleeps well with his C-Pap.  He had a rough semester and had some colleagues pass away.  He still has some tinea versicolor around his anterior neck.  Review of Systems  See HPI.  Physical Exam  Vitals & Measurements  HR:?72(Peripheral)? BP:?110/72?  HT:?180?cm? WT:?114.7?kg? BMI:?35.4?  General: He is well-developed well-nourished pleasant obese white male in no apparent distress.  Chest: Clear to auscultation bilaterally.? CV: Regular rate and rhythm without murmurs rubs or gallops.  Assessment/Plan  1.?Hypertension?I10  ?Well-controlled; continue current medication.  Ordered:  Clinic Follow up, *Est. 06/11/20 3:00:00 CDT, Order for future visit, Hypertension  High cholesterol  IGT - Impaired glucose tolerance, ink AFP  Office/Outpatient Visit Level 3 Established [id number] PC, Hypertension  High cholesterol  IGT - Impaired glucose tolerance, INK AMB - AFP, 12/11/19 8:22:00 CST  ?  2.?High cholesterol?E78.00  Well-controlled as per last lipid panel.  Ordered:  Clinic Follow up, *Est. 06/11/20 3:00:00 CDT, Order for future visit, Hypertension  High cholesterol  IGT - Impaired glucose tolerance, HLink AFP  Office/Outpatient Visit Level 3 Established [id number] PC, Hypertension  High cholesterol  IGT - Impaired glucose tolerance, HLINK AMB - AFP, 12/11/19 8:22:00 CST  ?  3.?IGT - Impaired glucose  tolerance?R73.02  ?Check A1c today. ?Patient has been compliant with diet.  Ordered:  Clinic Follow up, *Est. 06/11/20 3:00:00 CDT, Order for future visit, Hypertension  High cholesterol  IGT - Impaired glucose tolerance, HLink AFP  Hemoglobin A1c, Routine collect, 12/11/19 8:23:00 CST, Blood, Order for future visit, Stop date 12/11/19 8:23:00 CST, Lab Collect, IGT - Impaired glucose tolerance, 12/11/19 8:23:00 CST  Office/Outpatient Visit Level 3 Established [id number] PC, Hypertension  High cholesterol  IGT - Impaired glucose tolerance, HLINK AMB - AFP, 12/11/19 8:22:00 CST  ?  4.?Plantar fasciitis, left?M72.2  ?Patient continues to have symptoms even with conservative measures.  Will refer to Dr. Gian Chaudhry need for further evaluation.  Ordered:  External Referral, left plantar fasciitis, Podiatry, Dr Gian Chaudhry only, 12/11/19 8:30:00 CST, Plantar fasciitis, left  ?  5.?Immunization due?Z23  ?Shingrix?vaccine 0.5 IM administered; patient advised he will need a second vaccine in 2 to 6 months.  Ordered:  zoster vaccine, inactivated, 0.5 mL, IM, Once-NOW, first dose 12/11/19 8:23:00 CST, stop date 12/11/19 8:23:00 CST  ?  Orders:  ketoconazole, See Instructions, Take 2 tablets as directed., # 2 tab(s), 0 Refill(s), Pharmacy: [facility]/pharmacy #[id number]  Patient?has remnants?of?tinea versicolor; prescription?for ketoconazole 200 mg x 2 sent to pharmacy.  ?  Referrals  External Referral, left plantar fasciitis, Podiatry, Dr Gian Chaudhry only, 12/11/19 8:30:00 CST, Plantar fasciitis, left  Clinic Follow up, *Est. 06/11/20 3:00:00 CDT, Order for future visit, Hypertension  High cholesterol  IGT - Impaired glucose tolerance, HLink AFP   Problem List/Past Medical History  Ongoing  Elevated PSA  High cholesterol  Hypertension  IGT - Impaired glucose tolerance  Obesity  WENDY - Obstructive sleep apnea  Plantar fasciitis, left  Plantar wart  Tinea versicolor  Historical  HLD - Hyperlipidemia  Polyp of colon  Seborrheic  keratosis  Procedure/Surgical History  Catheter placement in coronary artery(s) for coronary angiography, including intraprocedural injection(s) for coronary angiography, imaging supervision and interpretation; with left heart catheterization including intraprocedural injection(s) for left nick (10/06/2017)  Fluoroscopy of Left Heart using Low Osmolar Contrast (10/06/2017)  Fluoroscopy of Multiple Coronary Arteries using Low Osmolar Contrast (10/06/2017)  Measurement of Cardiac Sampling and Pressure, Left Heart, Percutaneous Approach (10/06/2017)  Colonoscopy (08/02/2017)  Colonoscopy (01/23/2013)  Knee Surgery, Right (1999)   Medications  aspirin 81 mg oral tablet, 81 mg= 1 tab(s), Oral, qPM  atorvastatin 20 mg oral tablet, See Instructions, 11 refills  Fish Oil oral capsule, 1 cap(s), Oral, Daily  ketoconazole 200 mg oral tablet, See Instructions  lisinopril 10 mg oral tablet, See Instructions, 5 refills  multivitamin with minerals (Adult Tab), 1 tab(s), Oral, Daily  Shingrix, 0.5 mL, IM, Once-NOW  tamsulosin 0.4 mg oral capsule, See Instructions, 5 refills  Allergies  penicillin?(Nightmares (as a toddler))  Social History  Abuse/Neglect  No, 12/11/2019  No, 09/18/2019  No, 06/11/2019  Alcohol  Current, Beer, Wine, 1-2 times per week, 05/10/2018  Employment/School  Employed, Work/School description: PROFESSOR., 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 Home equipment: CPAP/BiPAP., 05/10/2018  Nutrition/Health  Regular, Caffeine intake amount: 2 CUPS OF COFFEE PER DAY., 05/10/2018  Substance Use  Never, 05/10/2018  Tobacco  Never (less than 100 in lifetime), N/A, 12/11/2019  Never (less than 100 in lifetime), N/A, 09/18/2019  Never (less than 100 in lifetime), No, 06/11/2019  Never smoker, N/A, 12/17/2018  Never smoker, 10/06/2017  Family History  CAD - Coronary artery disease: Mother.  Diabetes mellitus: Mother.  Food allergy:  Sister.  Hypertension: Mother.  Obesity: Sister, Brother and Brother.  Immunizations  Vaccine Date Status   influenza virus vaccine, inactivated 09/18/2019 Given   tetanus/diphtheria/pertussis, acel(Tdap) 06/11/2019 Given   influenza virus vaccine, inactivated 10/17/2018 Recorded   influenza virus vaccine, inactivated 09/19/2017 Recorded   pneumococcal 13-valent conjugate vaccine 03/17/2015 Recorded   influenza virus vaccine, inactivated 12/31/2014 Recorded   Health Maintenance  Health Maintenance  ???Pending?(in the next year)  ??? ??OverDue  ??? ? ? ?Aspirin Therapy for CVD Prevention due??10/03/18??and every 1??year(s)  ??? ? ? ?Alcohol Misuse Screening due??01/01/19??and every 1??year(s)  ??? ??Due?  ??? ? ? ?ADL Screening due??12/11/19??and every 1??year(s)  ??? ? ? ?Hypertension Management-Education due??12/11/19??and every 1??year(s)  ??? ? ? ?Zoster Vaccine due??12/11/19??and every 100??year(s)  ??? ??Due In Future?  ??? ? ? ?Obesity Screening not due until??01/01/20??and every 1??year(s)  ???Satisfied?(in the past 1 year)  ??? ??Satisfied?  ??? ? ? ?Obesity Screening on??12/11/19.??Satisfied by Louise Ulloa LPN  ??? ? ? ?Tetanus Vaccine on??06/11/19.??Satisfied by Louise Ulloa LPN  ?

## 2022-05-03 NOTE — HISTORICAL [facility]
This is a historical note converted from Daniel. Formatting and pictures may have been removed.  Please reference Daniel for original formatting and attached multimedia. Chief Complaint  Annual wellness physical- NPO  History of Present Illness  Patient presents for wellness exam.  He has been feeling well.  He is sleeping better with his C-PAP; he is compliant with treatment.  He has cleaned up his diet and is eating less.  He is a now an .  He has?beer or wine once a week.  He exercises daily: bikes, walks, weights.  He drinks 2 cups of coffee a day.?  He does not smoke.  Colonoscopy 2017: Dr Thomas.  Cardiologist: Dr Jennings. He has an appointment with him in a few weeks.  Review of Systems  Constitutional:??no?weight gain,??no?weight loss,??no?fatigue, ?no?fever, ?no?chills, ?no?weakness, ?no?trouble sleeping; + WENDY on C-PAP  Eyes: ?no?vision loss/changes,??+?glasses,??no?pain,??no?redness,??no?blurry or double vision,??no?flashing lights,??no?glaucoma,??no?cataracts  Last eye exam:??has been a few years  Neck: ?no?lymphadenopathy,??no?thyroid abnormalities,??no?bruits,??no?stiffness  Ears:??no?decreased hearing,??no?tinnitus,??no?earache,??no?drainage?  Nose:??no?congestion,??no?rhinorrhea,??no?epistaxis,??no?sinus pressure  Throat/Oral:??no?sore throat,??no?hoarseness, ?no?dental caries,??no?gum bleeding,??no?oral lesions  Cardiovascular:??no?chest pain, palpitations, or tightness,??no?dyspnea with exertion,??no?orthopnea,??no?paroxysmal nocturnal dyspnea, +hypertension, + hypercholesteremia  Respiratory:??no?cough,??no?sputum,??no?hemoptysis,??no?dyspnea,??no?wheezing,??no?pleuritic chest pain?  Gastrointestinal:??no?abdominal pain,??no?nausea,??no?vomiting,??no?heartburn,??no?dysphagia or odynophagia,??no?diarrhea,??no?constipation,??no?melena,??no?hematochezia,?no?jaundice  Urinary:??no?frequency,??no?urgency,??no?burning or pain,?no?hematuria,??no?incontinence,??no?hesitancy,??no?incomplete  voiding,??no?flank pain,??no?nocturia, he has a better flow with tamsulosin  Musculoskeletal:?no?myalgias,??no?arthralgias,?no?neck pain,??no?back pain,??no?swelling of extremities, he reports he has left tennis elbow for some time, it inhibits his activities  Skin:?no?rashes,??no?sores,??no?non-healing wounds, still has tinea on his chest  Neurologic:??no?headaches,??no?dizziness/lightheadedness,??no?tremors,??no?paresthesias,??no?seizures,??no?muscle weakness  Psychiatric:??no?depression/sadness,??no?anhedonia,??no?irritability,??no?suicidal ideations,??no?anxiety,??no?panic attacks  Endocrine:??no?hot or cold intolerance,??no?sweating,??no?polyuria,??no?polydipsia,??no?polyphagia  Hematologic:??no?bruising,??no?bleeding disorders?  Physical Exam  Vitals & Measurements  T:?36.8? ?C (Oral)? HR:?72(Peripheral)? BP:?100/70?  HT:?180?cm? WT:?115.1?kg? BMI:?35.52?  ?  GENERAL: The patient is a well-developed, well-nourished obese white?male in no?apparent distress. He is alert and oriented x 4.  HEENT: Head is normocephalic and atraumatic. Extraocular muscles are intact. Pupils are equal, round, and reactive to light and accommodation. Nares appeared normal. Mouth is well hydrated and without lesions. Mucous membranes are moist. Posterior pharynx clear of any exudate or lesions.  NECK: Supple. No carotid bruits.? No lymphadenopathy or thyromegaly.  LUNGS: Clear to auscultation.  HEART: Regular rate and rhythm without murmur, gallops or rubs.  ABDOMEN: Soft, nontender, obese and nondistended.? Positive bowel sounds.? No hepatosplenomegaly was noted.  EXTREMITIES: Without any cyanosis, clubbing, rash, lesions or edema.  NEUROLOGIC: Cranial nerves II through XII are grossly intact.? No motor or sensory deficits.? Cerebellar function intact.  SKIN: No ulceration or induration present.  :? Normal male genitalia, no hernias, testes descended with normal size and consistency.??NST,??prostate soft, smooth and without  nodules.  ?  ?  Assessment/Plan  1.?Wellness examination?Z00.00  Patient presents for wellness examination.  He has been feeling well.  His foot is feeling much better;?he saw Dr. Chaudhry and had 2 injections.?  Shingrix 0.5 IM administered.  He is up-to-date with his colonoscopies.  Patient encouraged to continue?to lose weight and exercise.  Labs pending; will?send?results to Dr. Jennings.  Ordered:  Clinic Follow-Up Wellness, *Est. 07/21/21 3:00:00 CDT, Order for future visit, Wellness examination  Hypertension  High cholesterol  IGT - Impaired glucose tolerance  WENDY - Obstructive sleep apnea  Obesity  Left lateral epicondylitis  Immunization due  Tinea versicolor,...  Preventative Health Care Est 40-64 years [id number] PC, Wellness examination  Hypertension  High cholesterol  IGT - Impaired glucose tolerance  WENDY - Obstructive sleep apnea  Obesity  Left lateral epicondylitis  Immunization due  Tinea versicolor, [facility] - AFP, 07/21/20 8:09:00 CDT  Prostate Specific Antigen, Routine collect, 07/21/20 8:21:00 CDT, Blood, Stop date 07/21/20 8:21:00 CDT, Lab Collect, Wellness examination, 07/21/20 8:21:00 CDT  Urinalysis no Reflex, Routine collect, Urine, 07/21/20 8:21:00 CDT, Stop date 07/21/20 8:21:00 CDT, Nurse collect, Wellness examination  Hypertension  ?  2.?Hypertension?I10  Well-controlled; continue current medication.  Ordered:  Clinic Follow up, *Est. 01/21/21 3:00:00 CST, Order for future visit, Hypertension  High cholesterol  IGT - Impaired glucose tolerance  WENDY - Obstructive sleep apnea  Obesity, [facility]  Clinic Follow-Up Wellness, *Est. 07/21/21 3:00:00 CDT, Order for future visit, Wellness examination  Hypertension  High cholesterol  IGT - Impaired glucose tolerance  WENDY - Obstructive sleep apnea  Obesity  Left lateral epicondylitis  Immunization due  Tinea versicolor,...  Preventative Health Care Est 40-64 years [id number] PC, Wellness examination  Hypertension  High cholesterol   IGT - Impaired glucose tolerance  WENDY - Obstructive sleep apnea  Obesity  Left lateral epicondylitis  Immunization due  Tinea versicolor, [facility] - AFP, 07/21/20 8:09:00 CDT  Urinalysis no Reflex, Routine collect, Urine, 07/21/20 8:21:00 CDT, Stop date 07/21/20 8:21:00 CDT, Nurse collect, Wellness examination  Hypertension  ?  3.?High cholesterol?E78.00  Lipid profile pending.  Ordered:  Clinic Follow up, *Est. 01/21/21 3:00:00 CST, Order for future visit, Hypertension  High cholesterol  IGT - Impaired glucose tolerance  WENDY - Obstructive sleep apnea  Obesity, [facility] AFP  Clinic Follow-Up Wellness, *Est. 07/21/21 3:00:00 CDT, Order for future visit, Wellness examination  Hypertension  High cholesterol  IGT - Impaired glucose tolerance  WENDY - Obstructive sleep apnea  Obesity  Left lateral epicondylitis  Immunization due  Tinea versicolor,...  Preventative Health Care Est 40-64 years [id number] PC, Wellness examination  Hypertension  High cholesterol  IGT - Impaired glucose tolerance  WENDY - Obstructive sleep apnea  Obesity  Left lateral epicondylitis  Immunization due  Tinea versicolor, [facility] - AFP, 07/21/20 8:09:00 CDT  ?  4.?IGT - Impaired glucose tolerance?R73.02  ?Check A1c.  Ordered:  Clinic Follow up, *Est. 01/21/21 3:00:00 CST, Order for future visit, Hypertension  High cholesterol  IGT - Impaired glucose tolerance  WENDY - Obstructive sleep apnea  Obesity, [facility] AFP  Clinic Follow-Up Wellness, *Est. 07/21/21 3:00:00 CDT, Order for future visit, Wellness examination  Hypertension  High cholesterol  IGT - Impaired glucose tolerance  WENDY - Obstructive sleep apnea  Obesity  Left lateral epicondylitis  Immunization due  Tinea versicolor,...  Preventative Health Care Est 40-64 years [id number] PC, Wellness examination  Hypertension  High cholesterol  IGT - Impaired glucose tolerance  WENDY - Obstructive sleep apnea  Obesity  Left lateral epicondylitis  Immunization due  Tinea  versicolor, [facility] - AFP, 07/21/20 8:09:00 CDT  ?  5.?WENDY - Obstructive sleep apnea?G47.33  ?Patient is compliant with and doing well on CPAP.  Ordered:  Clinic Follow up, *Est. 01/21/21 3:00:00 CST, Order for future visit, Hypertension  High cholesterol  IGT - Impaired glucose tolerance  WENDY - Obstructive sleep apnea  Obesity, [facility] AFP  Clinic Follow-Up Wellness, *Est. 07/21/21 3:00:00 CDT, Order for future visit, Wellness examination  Hypertension  High cholesterol  IGT - Impaired glucose tolerance  WENDY - Obstructive sleep apnea  Obesity  Left lateral epicondylitis  Immunization due  Tinea versicolor,...  Preventative Health Care Est 40-64 years [id number] PC, Wellness examination  Hypertension  High cholesterol  IGT - Impaired glucose tolerance  WENDY - Obstructive sleep apnea  Obesity  Left lateral epicondylitis  Immunization due  Tinea versicolor, [facility] - AFP, 07/21/20 8:09:00 CDT  ?  6.?Obesity?E66.9  Patient encouraged to continue dietary modifications, exercise and lose weight.  Ordered:  Clinic Follow up, *Est. 01/21/21 3:00:00 CST, Order for future visit, Hypertension  High cholesterol  IGT - Impaired glucose tolerance  WENDY - Obstructive sleep apnea  Obesity, [facility] AFP  Clinic Follow-Up Wellness, *Est. 07/21/21 3:00:00 CDT, Order for future visit, Wellness examination  Hypertension  High cholesterol  IGT - Impaired glucose tolerance  WENDY - Obstructive sleep apnea  Obesity  Left lateral epicondylitis  Immunization due  Tinea versicolor,...  Preventative Health Care Est 40-64 years [id number] PC, Wellness examination  Hypertension  High cholesterol  IGT - Impaired glucose tolerance  WENDY - Obstructive sleep apnea  Obesity  Left lateral epicondylitis  Immunization due  Tinea versicolor, [facility] - AFP, 07/21/20 8:09:00 CDT  ?  7.?Left lateral epicondylitis?M77.12  Trial of diclofenac 50 mg: Take 1 twice daily with food.  I advised patient to wear sleeve.  He is to call if  symptoms persist or worsen.  Ordered:  Clinic Follow-Up Wellness, *Est. 07/21/21 3:00:00 CDT, Order for future visit, Wellness examination  Hypertension  High cholesterol  IGT - Impaired glucose tolerance  WENDY - Obstructive sleep apnea  Obesity  Left lateral epicondylitis  Immunization due  Tinea versicolor,...  Preventative Health Care Est 40-64 years [id number] PC, Wellness examination  Hypertension  High cholesterol  IGT - Impaired glucose tolerance  WENDY - Obstructive sleep apnea  Obesity  Left lateral epicondylitis  Immunization due  Tinea versicolor, [facility] - AFP, 07/21/20 8:09:00 CDT  ?  8.?Immunization due?Z23  ?His second Shingrix vaccination was given today;?benefits/potential risks/side effects discussed with patient.  Ordered:  Clinic Follow-Up Wellness, *Est. 07/21/21 3:00:00 CDT, Order for future visit, Wellness examination  Hypertension  High cholesterol  IGT - Impaired glucose tolerance  WENDY - Obstructive sleep apnea  Obesity  Left lateral epicondylitis  Immunization due  Tinea versicolor,...  Preventative Health Care Est 40-64 years [id number] PC, Wellness examination  Hypertension  High cholesterol  IGT - Impaired glucose tolerance  WENDY - Obstructive sleep apnea  Obesity  Left lateral epicondylitis  Immunization due  Tinea versicolor, [facility] - AFP, 07/21/20 8:09:00 CDT  ?  9.?Tinea versicolor?B36.0  ?He has taken?Nizoral previously with only temporary relief.  We will try?to treat him with Nizoral again.? He is to take 2 tablets as directed.  Ordered:  Clinic Follow-Up Wellness, *Est. 07/21/21 3:00:00 CDT, Order for future visit, Wellness examination  Hypertension  High cholesterol  IGT - Impaired glucose tolerance  WENDY - Obstructive sleep apnea  Obesity  Left lateral epicondylitis  Immunization due  Tinea versicolor,...  Preventative Health Care Est 40-64 years [id number] PC, Wellness examination  Hypertension  High cholesterol  IGT - Impaired glucose tolerance   WENDY - Obstructive sleep apnea  Obesity  Left lateral epicondylitis  Immunization due  Tinea versicolor, [facility] - AFP, 07/21/20 8:09:00 CDT  ?  Orders:  atorvastatin, See Instructions, TAKE 1 TABLET BY MOUTH EVERY EVENING, # 90 tab(s), 3 Refill(s), [id number], 180, cm, Height/Length Dosing, 07/21/20 7:14:00 CDT, 115.1, kg, Weight Dosing, 07/21/20 7:14:00 CDT  diclofenac, 50 mg = 1 tab(s), Oral, BID, PRN PRN for menstrual pain, # 30 tab(s), 0 Refill(s), Pharmacy: [facility]/pharmacy #[id number], 180, cm, Height/Length Dosing, 07/21/20 7:14:00 CDT, 115.1, kg, Weight Dosing, 07/21/20 7:14:00 CDT  ketoconazole, See Instructions, Take 2 tablets as directed., # 2 tab(s), 0 Refill(s), Pharmacy: [facility]/pharmacy #[id number], 180, cm, Height/Length Dosing, 07/21/20 7:14:00 CDT, 115.1, kg, Weight Dosing, 07/21/20 7:14:00 CDT  lisinopril, See Instructions, TAKE 1 TABLET BY MOUTH EVERY DAY, # 90 tab(s), 3 Refill(s), Pharmacy: [facility]/pharmacy #[id number], 180, cm, Height/Length Dosing, 07/21/20 7:14:00 CDT, 115.1, kg, Weight Dosing, 07/21/20 7:14:00 CDT  tamsulosin, 0.4 mg = 1 cap(s), Oral, Daily, # 90 cap(s), 3 Refill(s), Pharmacy: [facility]/pharmacy #[id number], 180, cm, Height/Length Dosing, 07/21/20 7:14:00 CDT, 115.1, kg, Weight Dosing, 07/21/20 7:14:00 CDT  Referrals  Clinic Follow up, *Est. 01/21/21 3:00:00 CST, Order for future visit, Hypertension  High cholesterol  IGT - Impaired glucose tolerance  WENDY - Obstructive sleep apnea  Obesity, [facility] AFP  Clinic Follow-Up Wellness, *Est. 07/21/21 3:00:00 CDT, Order for future visit, Wellness examination  Hypertension  High cholesterol  IGT - Impaired glucose tolerance  WENDY - Obstructive sleep apnea  Obesity  Left lateral epicondylitis  Immunization due  Tinea versicolor,...   Problem List/Past Medical History  Ongoing  Elevated PSA  High cholesterol  Hypertension  IGT - Impaired glucose tolerance  Obesity  WENDY - Obstructive sleep apnea  Plantar fasciitis, left  Plantar wart  Tinea  versicolor  Historical  HLD - Hyperlipidemia  Polyp of colon  Seborrheic keratosis  Procedure/Surgical History  Catheter placement in coronary artery(s) for coronary angiography, including intraprocedural injection(s) for coronary angiography, imaging supervision and interpretation; with left heart catheterization including intraprocedural injection(s) for left nick (10/06/2017)  Fluoroscopy of Left Heart using Low Osmolar Contrast (10/06/2017)  Fluoroscopy of Multiple Coronary Arteries using Low Osmolar Contrast (10/06/2017)  Measurement of Cardiac Sampling and Pressure, Left Heart, Percutaneous Approach (10/06/2017)  Colonoscopy (08/02/2017)  Colonoscopy (01/23/2013)  Knee Surgery, Right (1999)   Medications  aspirin 81 mg oral tablet, 81 mg= 1 tab(s), Oral, qPM  atorvastatin 20 mg oral tablet, See Instructions, 3 refills  diclofenac potassium 50 mg oral tablet, 50 mg= 1 tab(s), Oral, BID, PRN  Fish Oil oral capsule, 1 cap(s), Oral, Daily  ketoconazole 200 mg oral tablet, See Instructions  lisinopril 10 mg oral tablet, See Instructions, 3 refills  multivitamin with minerals (Adult Tab), 1 tab(s), Oral, Daily  tamsulosin 0.4 mg oral capsule, 0.4 mg= 1 cap(s), Oral, Daily, 3 refills  Allergies  penicillin?(Nightmares (as a toddler))  Social History  Abuse/Neglect  No, 07/21/2020  Alcohol  Current, Beer, Wine, 1-2 times per week, 05/10/2018  Employment/School  Employed, Work/School description: PROFESSOR., 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 Home equipment: CPAP/BiPAP., 05/10/2018  Nutrition/Health  Regular, Caffeine intake amount: 2 CUPS OF COFFEE PER DAY., 05/10/2018  Substance Use  Never, 05/10/2018  Tobacco  Never (less than 100 in lifetime), N/A, 07/21/2020  Family History  CAD - Coronary artery disease: Mother.  Diabetes mellitus: Mother.  Food allergy: Sister.  Hypertension: Mother.  Obesity: Sister, Brother and  Brother.  Immunizations  Vaccine Date Status   zoster vaccine, inactivated 07/21/2020 Given   zoster vaccine, inactivated 12/11/2019 Given   influenza virus vaccine, inactivated 09/18/2019 Given   tetanus/diphtheria/pertussis, acel(Tdap) 06/11/2019 Given   influenza virus vaccine, inactivated 10/17/2018 Recorded   influenza virus vaccine, inactivated 09/19/2017 Recorded   pneumococcal 13-valent conjugate vaccine 03/17/2015 Recorded   influenza virus vaccine, inactivated 12/31/2014 Recorded   Health Maintenance  Health Maintenance  ???Pending?(in the next year)  ??? ??OverDue  ??? ? ? ?Zoster Vaccine due??and every?  ??? ? ? ?Aspirin Therapy for CVD Prevention due??10/03/18??and every 1??year(s)  ??? ? ? ?Alcohol Misuse Screening due??01/02/20??and every 1??year(s)  ??? ??Due?  ??? ? ? ?ADL Screening due??07/21/20??and every 1??year(s)  ??? ? ? ?Depression Screening due??07/21/20??and every?  ??? ? ? ?Hypertension Management-Education due??07/21/20??and every 1??year(s)  ??? ? ? ?Influenza Vaccine due??07/21/20??and every?  ??? ??Due In Future?  ??? ? ? ?Obesity Screening not due until??01/01/21??and every 1??year(s)  ???Satisfied?(in the past 1 year)  ??? ??Satisfied?  ??? ? ? ?Blood Pressure Screening on??07/21/20.??Satisfied by Louise Ulloa LPN  ??? ? ? ?Body Mass Index Check on??07/21/20.??Satisfied by Louise Ulloa LPN  ??? ? ? ?Coronary Artery Disease Maintenance-Lipid Lowering Therapy on??07/21/20.??Satisfied by Claudio Maria MD  ??? ? ? ?Diabetes Screening on??07/21/20.??Satisfied by Ariadne Everett  ??? ? ? ?Hypertension Management-BMP on??07/21/20.??Satisfied by Ariadne Everett  ??? ? ? ?Influenza Vaccine on??09/19/19.??Satisfied by Claudio Maria MD  ??? ? ? ?Lipid Screening on??07/21/20.??Satisfied by Ariadne Everett  ??? ? ? ?Obesity Screening on??07/21/20.??Satisfied by Louise Ulloa LPN  ??? ? ? ?Zoster Vaccine on??07/21/20.??Satisfied by Bubba Vasquez LPN  ?

## 2022-05-03 NOTE — HISTORICAL [facility]
This is a historical note converted from Daniel. Formatting and pictures may have been removed.  Please reference Daniel for original formatting and attached multimedia. Chief Complaint  Annual wellness physical- NPO  History of Present Illness  Patient presents for wellness exam.  He has been feeling well.  He is sleeping better with his C-PAP; he is compliant with treatment.  He has cleaned up his diet and is eating less.  He is a now an .  He has?beer or wine once a week. He he has not been drinking at all.  He exercises daily: bikes, walks, weights.  He drinks 2 cups of coffee a day.?  He does not smoke.  Colonoscopy 2017: Dr Thomas.  Cardiologist: Dr Jennings. He will be seeing him in October.  Review of Systems  Constitutional:??no?weight gain,??+?weight loss: 9 #, ?no?fatigue, ?no?fever, ?no?chills, ?no?weakness, ?no?trouble sleeping; + WENDY on C-PAP  Eyes: ?no?vision loss/changes,??+?glasses, + left astigmatism, ??no?pain,??no?redness,??no?blurry or double vision,??no?flashing lights,??no?glaucoma,??no?cataracts  Last eye exam:?? has been a few years  Neck: ?no?lymphadenopathy,??no?thyroid abnormalities,??no?bruits,??no?stiffness  Ears:??no?decreased hearing,??no?tinnitus,??no?earache,??no?drainage?  Nose:??no?congestion,??no?rhinorrhea,??no?epistaxis,??no?sinus pressure  Throat/Oral:??no?sore throat,??no?hoarseness, ?no?dental caries,??no?gum bleeding,??no?oral lesions  Cardiovascular:??no?chest pain, palpitations, or tightness,??no?dyspnea with exertion,??no?orthopnea,??no?paroxysmal nocturnal dyspnea, +hypertension, + hypercholesteremia  Respiratory:??no?cough,??no?sputum,??no?hemoptysis,??no?dyspnea,??no?wheezing,??no?pleuritic chest pain?  Gastrointestinal:??no?abdominal pain,??no?nausea,??no?vomiting,??no?heartburn,??no?dysphagia or odynophagia,??no?diarrhea,??no?constipation,??no?melena,??no?hematochezia,?no?jaundice  Urinary:??no?frequency,??no?urgency,??no?burning or  pain,?no?hematuria,??no?incontinence,??no?hesitancy,??occasional?incomplete voiding,??no?flank pain,??no?nocturia, he has a better flow with tamsulosin  Musculoskeletal:?no?myalgias,??no?arthralgias,?no?neck pain,??no?back pain,??no?swelling of extremities  Skin:?no?rashes,??no?sores,??no?non-healing wounds, still has tinea on his chest  Neurologic:??no?headaches,??no?dizziness/lightheadedness,??no?tremors,??no?paresthesias,??no?seizures,??no?muscle weakness  Psychiatric:??no?depression/sadness,??no?anhedonia,??no?irritability,??no?suicidal ideations,??no?anxiety,??no?panic attacks  Endocrine:??no?hot or cold intolerance,??no?sweating,??no?polyuria,??no?polydipsia,??no?polyphagia  Hematologic:??no?bruising,??no?bleeding disorders?  Physical Exam  Vitals & Measurements  T:?36.8? ?C (Oral)? HR:?63(Peripheral)? BP:?128/68? SpO2:?96%?  HT:?182.00?cm? WT:?110.900?kg? BMI:?33.48?  ?  GENERAL: The patient is a well-developed, well-nourished obese male in no?apparent distress. He is alert and oriented x 4.  HEENT: Head is normocephalic and atraumatic. Extraocular muscles are intact. Pupils are equal, round, and reactive to light and accommodation. Nares appeared normal. Mouth is well hydrated and without lesions. Mucous membranes are moist. Posterior pharynx clear of any exudate or lesions.  NECK: Supple. No carotid bruits.? No lymphadenopathy or thyromegaly.  LUNGS: Clear to auscultation.  HEART: Regular rate and rhythm without murmur, gallops or rubs.  ABDOMEN: Soft, nontender, and nondistended.? Positive bowel sounds.? No hepatosplenomegaly was noted.  EXTREMITIES: Without any cyanosis, clubbing, rash, lesions or edema.  NEUROLOGIC: Cranial nerves II through XII are grossly intact.? No motor or sensory deficits.? Cerebellar function intact.  SKIN: No ulceration or induration present.  :? Normal male genitalia, no hernias,?testes descended with normal size and consistency. ?NST,??prostate soft, smooth, mildly  enlarged?and without nodules.  ?  Assessment/Plan  1.?Wellness examination?Z00.00  ?Patient presents for wellness examination.  He has been feeling well.  He continues a strict diet and exercise regimen.? He has lost 9 pounds.  He is up-to-date with his colonoscopies.  Patient is followed by Dr. Jennings;?next appointment?is in October.? I will send labs to?Dr. Jennings.  Patient has been vaccinated for COVID.  Labs pending.  Ordered:  Automated Diff, Routine collect, 08/06/21 7:57:00 CDT, Blood, Collected, Stop date 08/06/21 7:57:00 CDT, Lab Collect, Wellness examination  Hypertension, 08/06/21 7:57:00 CDT  CBC w/ Auto Diff, Routine collect, 08/06/21 7:57:00 CDT, Blood, Stop date 08/06/21 7:57:00 CDT, Lab Collect, Wellness examination  Hypertension, 08/06/21 7:57:00 CDT  Clinic Follow-Up Wellness, *Est. 08/06/22 3:00:00 CDT, Order for future visit, Wellness examination, HLink AFP  Comprehensive Metabolic Panel, Routine collect, 08/06/21 7:57:00 CDT, Blood, Stop date 08/06/21 7:57:00 CDT, Lab Collect, Wellness examination  Hypertension, 08/06/21 7:57:00 CDT  Lipid Panel, Routine collect, 08/06/21 7:57:00 CDT, Blood, Stop date 08/06/21 7:57:00 CDT, Lab Collect, Wellness examination  High cholesterol  Hypertension, 08/06/21 7:57:00 CDT  Preventative Health Care Est 40-64 years [id number] PC, Wellness examination  Hypertension  High cholesterol  WENDY - Obstructive sleep apnea  Benign prostate hyperplasia, HLINK AMB - AFP, 08/06/21 7:51:00 CDT  Prostate Specific Antigen, Routine collect, 08/06/21 7:57:00 CDT, Blood, Stop date 08/06/21 7:57:00 CDT, Lab Collect, Wellness examination  Benign prostate hyperplasia, 08/06/21 7:57:00 CDT  Urinalysis no Reflex, Routine collect, Urine, 08/06/21 7:57:00 CDT, Stop date 08/06/21 7:57:00 CDT, Nurse collect, Wellness examination  Hypertension  Benign prostate hyperplasia  ?  2.?Hypertension?I10  ?Controlled; continue current medication. ?Refills sent.  Ordered:  Automated Diff, Routine  collect, 08/06/21 7:57:00 CDT, Blood, Collected, Stop date 08/06/21 7:57:00 CDT, Lab Collect, Wellness examination  Hypertension, 08/06/21 7:57:00 CDT  CBC w/ Auto Diff, Routine collect, 08/06/21 7:57:00 CDT, Blood, Stop date 08/06/21 7:57:00 CDT, Lab Collect, Wellness examination  Hypertension, 08/06/21 7:57:00 CDT  Clinic Follow up, *Est. 02/06/22 3:00:00 CST, Order for future visit, Hypertension  High cholesterol  WENDY - Obstructive sleep apnea  Benign prostate hyperplasia  Obesity, HLink AFP  Comprehensive Metabolic Panel, Routine collect, 08/06/21 7:57:00 CDT, Blood, Stop date 08/06/21 7:57:00 CDT, Lab Collect, Wellness examination  Hypertension, 08/06/21 7:57:00 CDT  Lipid Panel, Routine collect, 08/06/21 7:57:00 CDT, Blood, Stop date 08/06/21 7:57:00 CDT, Lab Collect, Wellness examination  High cholesterol  Hypertension, 08/06/21 7:57:00 CDT  Preventative Health Care Est 40-64 years [id number] PC, Wellness examination  Hypertension  High cholesterol  WENDY - Obstructive sleep apnea  Benign prostate hyperplasia, HLINK AMB - AFP, 08/06/21 7:51:00 CDT  Urinalysis no Reflex, Routine collect, Urine, 08/06/21 7:57:00 CDT, Stop date 08/06/21 7:57:00 CDT, Nurse collect, Wellness examination  Hypertension  Benign prostate hyperplasia  ?  3.?High cholesterol?E78.00  ?Patient has been compliant with medication; refills sent.  Lipid profile?pending.  Ordered:  Clinic Follow up, *Est. 02/06/22 3:00:00 CST, Order for future visit, Hypertension  High cholesterol  WENDY - Obstructive sleep apnea  Benign prostate hyperplasia  Obesity, HLink AFP  Lipid Panel, Routine collect, 08/06/21 7:57:00 CDT, Blood, Stop date 08/06/21 7:57:00 CDT, Lab Collect, Wellness examination  High cholesterol  Hypertension, 08/06/21 7:57:00 CDT  Preventative Health Care Est 40-64 years [id number] PC, Wellness examination  Hypertension  High cholesterol  WENDY - Obstructive sleep apnea  Benign prostate hyperplasia, HLINK AMB - AFP, 08/06/21  7:51:00 CDT  ?  4.?WENDY - Obstructive sleep apnea?G47.33  Patient is compliant with and doing well on CPAP.? It is medically prudent?for patient to continue?CPAP.  Ordered:  Clinic Follow up, *Est. 02/06/22 3:00:00 CST, Order for future visit, Hypertension  High cholesterol  WENDY - Obstructive sleep apnea  Benign prostate hyperplasia  Obesity, HLink AFP  Preventative Health Care Est 40-64 years [id number] PC, Wellness examination  Hypertension  High cholesterol  WENDY - Obstructive sleep apnea  Benign prostate hyperplasia, HLINK AMB - AFP, 08/06/21 7:51:00 CDT  ?  5.?Benign prostate hyperplasia?N40.0  ?His urination has improved with tamsulosin.? Refills sent.  Ordered:  Clinic Follow up, *Est. 02/06/22 3:00:00 CST, Order for future visit, Hypertension  High cholesterol  WENDY - Obstructive sleep apnea  Benign prostate hyperplasia  Obesity, HLink AFP  Preventative Health Care Est 40-64 years [id number] PC, Wellness examination  Hypertension  High cholesterol  WENDY - Obstructive sleep apnea  Benign prostate hyperplasia, HLINK AMB - AFP, 08/06/21 7:51:00 CDT  Prostate Specific Antigen, Routine collect, 08/06/21 7:57:00 CDT, Blood, Stop date 08/06/21 7:57:00 CDT, Lab Collect, Wellness examination  Benign prostate hyperplasia, 08/06/21 7:57:00 CDT  Urinalysis no Reflex, Routine collect, Urine, 08/06/21 7:57:00 CDT, Stop date 08/06/21 7:57:00 CDT, Nurse collect, Wellness examination  Hypertension  Benign prostate hyperplasia  ?  6.?Obesity?E66.9  Patient encouraged to continue diet?and exercise.  Ordered:  Clinic Follow up, *Est. 02/06/22 3:00:00 CST, Order for future visit, Hypertension  High cholesterol  WENDY - Obstructive sleep apnea  Benign prostate hyperplasia  Obesity, HLink AFP  ?  Orders:  atorvastatin, See Instructions, TAKE 1 TABLET BY MOUTH EVERY DAY IN THE EVENING, # 90 tab(s), 3 Refill(s), Pharmacy: [facility] #[id number], 182, cm, Height/Length Dosing, 08/06/21 7:15:00 CDT, 110.9, kg, Weight Dosing,  08/06/21 7:15:00 CDT  lisinopril, See Instructions, TAKE 1 TABLET BY MOUTH EVERY DAY, # 90 tab(s), 3 Refill(s), [id number], 182, cm, Height/Length Dosing, 08/06/21 7:15:00 CDT, 110.9, kg, Weight Dosing, 08/06/21 7:15:00 CDT  tamsulosin, 0.4 mg = 1 cap(s), Oral, Daily, # 90 cap(s), 3 Refill(s), [id number], 182, cm, Height/Length Dosing, 08/06/21 7:15:00 CDT, 110.9, kg, Weight Dosing, 08/06/21 7:15:00 CDT  Referrals  Clinic Follow up, *Est. 02/06/22 3:00:00 CST, Order for future visit, Hypertension  High cholesterol  WENDY - Obstructive sleep apnea  Benign prostate hyperplasia  Obesity, [facility] AFP  Clinic Follow-Up Wellness, *Est. 08/06/22 3:00:00 CDT, Order for future visit, Wellness examination, ink AFP   Problem List/Past Medical History  Ongoing  Benign prostate hyperplasia  Elevated PSA  High cholesterol  Hypertension  Obesity  WENDY - Obstructive sleep apnea  Plantar fasciitis, left  Plantar wart  Tinea versicolor  Historical  HLD - Hyperlipidemia  Polyp of colon  Seborrheic keratosis  Procedure/Surgical History  Catheter placement in coronary artery(s) for coronary angiography, including intraprocedural injection(s) for coronary angiography, imaging supervision and interpretation; with left heart catheterization including intraprocedural injection(s) for left nick (10/06/2017)  Fluoroscopy of Left Heart using Low Osmolar Contrast (10/06/2017)  Fluoroscopy of Multiple Coronary Arteries using Low Osmolar Contrast (10/06/2017)  Measurement of Cardiac Sampling and Pressure, Left Heart, Percutaneous Approach (10/06/2017)  Colonoscopy (08/02/2017)  Colonoscopy (01/23/2013)  Knee Surgery, Right (1999)   Medications  aspirin 81 mg oral tablet, 81 mg= 1 tab(s), Oral, qPM  atorvastatin 20 mg oral tablet, See Instructions, 3 refills  Fish Oil oral capsule, 1 cap(s), Oral, Daily  lisinopril 10 mg oral tablet, See Instructions, 3 refills  multivitamin with minerals (Adult Tab), 1 tab(s),  Oral, Daily  tamsulosin 0.4 mg oral capsule, 0.4 mg= 1 cap(s), Oral, Daily, 3 refills  Allergies  penicillin?(Nightmares (as a toddler))  Social History  Abuse/Neglect  No, 03/01/2021  No, 07/21/2020  Alcohol  Current, Beer, Wine, 1-2 times per week, 05/10/2018  Employment/School  Employed, Work/School description: PROFESSOR., 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 Home equipment: CPAP/BiPAP., 05/10/2018  Nutrition/Health  Regular, Caffeine intake amount: 2 CUPS OF COFFEE PER DAY., 05/10/2018  Substance Use  Never, 05/10/2018  Tobacco  Never (less than 100 in lifetime), N/A, 03/01/2021  Never (less than 100 in lifetime), N/A, 07/21/2020  Family History  CAD - Coronary artery disease: Mother.  Diabetes mellitus: Mother.  Food allergy: Sister.  Hypertension: Mother.  Obesity: Sister, Brother and Brother.  Immunizations  Vaccine Date Status   COVID-19 mRNA, LNP-S, PF - Moderna 03/29/2021 Recorded   COVID-19 mRNA, LNP-S, PF - Moderna 03/01/2021 Recorded   influenza virus vaccine, inactivated 09/30/2020 Recorded   zoster vaccine, inactivated 07/21/2020 Given   zoster vaccine, inactivated 12/11/2019 Given   influenza virus vaccine, inactivated 09/19/2019 Recorded   influenza virus vaccine, inactivated 09/18/2019 Given   tetanus/diphtheria/pertussis, acel(Tdap) 06/11/2019 Given   influenza virus vaccine, inactivated 10/17/2018 Recorded   influenza virus vaccine, inactivated 09/19/2017 Recorded   pneumococcal 13-valent conjugate vaccine 03/17/2015 Recorded   influenza virus vaccine, inactivated 12/31/2014 Recorded   Health Maintenance  Health Maintenance  ???Pending?(in the next year)  ??? ??OverDue  ??? ? ? ?Aspirin Therapy for CVD Prevention due??10/03/18??and every 1??year(s)  ??? ? ? ?Influenza Vaccine due??10/01/20??and every 1??day(s)  ??? ? ? ?Alcohol Misuse Screening due??01/02/21??and every 1??year(s)  ??? ??Due?  ??? ? ? ?ADL Screening  due??08/06/21??and every 1??year(s)  ??? ? ? ?Depression Screening due??08/06/21??Unknown Frequency  ??? ? ? ?Hypertension Management-Education due??08/06/21??and every 1??year(s)  ??? ??Due In Future?  ??? ? ? ?Obesity Screening not due until??01/01/22??and every 1??year(s)  ??? ? ? ?Diabetes Screening not due until??03/09/22??and every 1??year(s)  ??? ? ? ?Hypertension Management-BMP not due until??03/09/22??and every 1??year(s)  ???Satisfied?(in the past 1 year)  ??? ??Satisfied?  ??? ? ? ?Blood Pressure Screening on??08/06/21.??Satisfied by Louise Ulloa LPN  ??? ? ? ?Body Mass Index Check on??08/06/21.??Satisfied by Louise Ulloa LPN  ??? ? ? ?Coronary Artery Disease Maintenance-Lipid Lowering Therapy on??08/06/21.??Satisfied by Claudio Maria MD  ??? ? ? ?Diabetes Screening on??03/01/21.??Satisfied by Ariadne Everett  ??? ? ? ?Hypertension Management-Blood Pressure on??08/06/21.??Satisfied by Louise Ulloa LPN  ??? ? ? ?Influenza Vaccine on??09/30/20.??Satisfied by Louise Ulloa LPN  ??? ? ? ?Obesity Screening on??08/06/21.??Satisfied by Louise Ulloa LPN  ?

## 2022-05-03 NOTE — HISTORICAL [facility]
This is a historical note converted from Ceranni. Formatting and pictures may have been removed.  Please reference Ceranni for original formatting and attached multimedia. Chief Complaint  6 MONTH RECHECK  History of Present Illness  Patient presents for 6-month recheck.  His A1c today is 6.0. ?In July it was 5.8.  He is getting his 1st COVID this afternoon.  He has been doing well with his diet.  He has been mostly fish and vegetables.  He has been walking and riding his bike.  He sleeps pretty well except thinking too much.  He is pleased with how school is going and classes are going well. He uses a hybrid model.  He has some cardiac tests scheduled next Tuesday.  ?  ?  Review of Systems  See HPI.  Physical Exam  Vitals & Measurements  T:?36.6? ?C (Oral)? HR:?77(Peripheral)? BP:?116/80? SpO2:?98%?  HT:?182.88?cm? HT:?182.88?cm? WT:?115.100?kg? WT:?115.1?kg? BMI:?34.41?  General: He is a well-developed well-nourished?obese white male in no apparent distress.? He is quite pleasant.  Chest: Clear to auscultation bilaterally.  CV: Regular rate and rhythm without murmurs rubs or gallops.  Assessment/Plan  1.?Impaired glucose tolerance?R73.02  ?His A1c is 6.0; he is disappointed with his results.  He is disappointed with lack of weight loss.  Patient encouraged to continue?eating well?and to consider varying his exercise routine.  Patient will consider medication?if he is unable to achieve significant weight loss the next 6 months.  Ordered:  Clinic Follow up, *Est. 09/01/21 3:00:00 CDT, Order for future visit, Impaired glucose tolerance  Hypertension  WENDY - Obstructive sleep apnea, ink AFP  Office/Outpatient Visit Level 3 Established [id number] PC, Impaired glucose tolerance  Hypertension  WENDY - Obstructive sleep apnea, HLINK AMB - AFP, 03/01/21 11:43:00 CST  ?  2.?Hypertension?I10  ?Well controlled.  Ordered:  Clinic Follow up, *Est. 09/01/21 3:00:00 CDT, Order for future visit, Impaired glucose tolerance   Hypertension  WENDY - Obstructive sleep apnea, HLink AFP  Office/Outpatient Visit Level 3 Established [id number] PC, Impaired glucose tolerance  Hypertension  WENDY - Obstructive sleep apnea, HLINK AMB - AFP, 03/01/21 11:43:00 CST  ?  3.?WENDY - Obstructive sleep apnea?G47.33  He is compliant?with?and doing well on?C-PAP.  Ordered:  Clinic Follow up, *Est. 09/01/21 3:00:00 CDT, Order for future visit, Impaired glucose tolerance  Hypertension  WENDY - Obstructive sleep apnea, HLink AFP  Office/Outpatient Visit Level 3 Established [id number] PC, Impaired glucose tolerance  Hypertension  WENDY - Obstructive sleep apnea, HLINK AMB - AFP, 03/01/21 11:43:00 CST  ?  Referrals  Clinic Follow up, *Est. 09/01/21 3:00:00 CDT, Order for future visit, Impaired glucose tolerance  Hypertension  WENDY - Obstructive sleep apnea, HLink AFP   Problem List/Past Medical History  Ongoing  Elevated PSA  High cholesterol  Hypertension  Obesity  WENDY - Obstructive sleep apnea  Plantar fasciitis, left  Plantar wart  Tinea versicolor  Historical  HLD - Hyperlipidemia  Polyp of colon  Seborrheic keratosis  Procedure/Surgical History  Catheter placement in coronary artery(s) for coronary angiography, including intraprocedural injection(s) for coronary angiography, imaging supervision and interpretation; with left heart catheterization including intraprocedural injection(s) for left nick (10/06/2017)  Fluoroscopy of Left Heart using Low Osmolar Contrast (10/06/2017)  Fluoroscopy of Multiple Coronary Arteries using Low Osmolar Contrast (10/06/2017)  Measurement of Cardiac Sampling and Pressure, Left Heart, Percutaneous Approach (10/06/2017)  Colonoscopy (08/02/2017)  Colonoscopy (01/23/2013)  Knee Surgery, Right (1999)   Medications  aspirin 81 mg oral tablet, 81 mg= 1 tab(s), Oral, qPM  atorvastatin 20 mg oral tablet, See Instructions, 3 refills  Fish Oil oral capsule, 1 cap(s), Oral, Daily  lisinopril 10 mg oral tablet, See Instructions, 3  refills  multivitamin with minerals (Adult Tab), 1 tab(s), Oral, Daily  tamsulosin 0.4 mg oral capsule, 0.4 mg= 1 cap(s), Oral, Daily, 3 refills  Allergies  penicillin?(Nightmares (as a toddler))  Social History  Abuse/Neglect  No, 03/01/2021  No, 07/21/2020  Alcohol  Current, Beer, Wine, 1-2 times per week, 05/10/2018  Employment/School  Employed, Work/School description: PROFESSOR., 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 Home equipment: CPAP/BiPAP., 05/10/2018  Nutrition/Health  Regular, Caffeine intake amount: 2 CUPS OF COFFEE PER DAY., 05/10/2018  Substance Use  Never, 05/10/2018  Tobacco  Never (less than 100 in lifetime), N/A, 03/01/2021  Never (less than 100 in lifetime), N/A, 07/21/2020  Family History  CAD - Coronary artery disease: Mother.  Diabetes mellitus: Mother.  Food allergy: Sister.  Hypertension: Mother.  Obesity: Sister, Brother and Brother.  Immunizations  Vaccine Date Status   zoster vaccine, inactivated 07/21/2020 Given   zoster vaccine, inactivated 12/11/2019 Given   influenza virus vaccine, inactivated 09/19/2019 Recorded   influenza virus vaccine, inactivated 09/18/2019 Given   tetanus/diphtheria/pertussis, acel(Tdap) 06/11/2019 Given   influenza virus vaccine, inactivated 10/17/2018 Recorded   influenza virus vaccine, inactivated 09/19/2017 Recorded   pneumococcal 13-valent conjugate vaccine 03/17/2015 Recorded   influenza virus vaccine, inactivated 12/31/2014 Recorded   Health Maintenance  Health Maintenance  ???Pending?(in the next year)  ??? ??OverDue  ??? ? ? ?Aspirin Therapy for CVD Prevention due??10/03/18??and every 1??year(s)  ??? ? ? ?Influenza Vaccine due??10/01/20??and every 1??day(s)  ??? ??Due?  ??? ? ? ?Alcohol Misuse Screening due??01/02/21??and every 1??year(s)  ??? ? ? ?ADL Screening due??03/01/21??and every 1??year(s)  ??? ? ? ?Depression Screening due??03/01/21??Unknown Frequency  ??? ? ?  ?Hypertension Management-Education due??03/01/21??and every 1??year(s)  ??? ??Due In Future?  ??? ? ? ?Hypertension Management-BMP not due until??07/21/21??and every 1??year(s)  ??? ? ? ?Obesity Screening not due until??01/01/22??and every 1??year(s)  ???Satisfied?(in the past 1 year)  ??? ??Satisfied?  ??? ? ? ?Blood Pressure Screening on??03/01/21.??Satisfied by Bubba Vasquez LPN  ??? ? ? ?Body Mass Index Check on??03/01/21.??Satisfied by Bubba Vasquez LPN  ??? ? ? ?Coronary Artery Disease Maintenance-Lipid Lowering Therapy on??07/21/20.??Satisfied by Claudio Maria MD  ??? ? ? ?Diabetes Screening on??03/01/21.??Satisfied by Ariadne Everett  ??? ? ? ?Hypertension Management-Blood Pressure on??03/01/21.??Satisfied by Bubba Vasquez LPN  ??? ? ? ?Lipid Screening on??07/21/20.??Satisfied by Ariadne Everett  ??? ? ? ?Obesity Screening on??03/01/21.??Satisfied by Bubba Vasquez LPN  ??? ? ? ?Zoster Vaccine on??07/21/20.??Satisfied by Bubba Vasquez LPN  ?